# Patient Record
Sex: MALE | Race: WHITE | NOT HISPANIC OR LATINO | Employment: UNEMPLOYED | URBAN - NONMETROPOLITAN AREA
[De-identification: names, ages, dates, MRNs, and addresses within clinical notes are randomized per-mention and may not be internally consistent; named-entity substitution may affect disease eponyms.]

---

## 2017-11-27 ENCOUNTER — TRANSCRIBE ORDERS (OUTPATIENT)
Dept: CARDIOLOGY | Facility: CLINIC | Age: 47
End: 2017-11-27

## 2017-11-27 DIAGNOSIS — R07.9 CHEST PAIN, UNSPECIFIED TYPE: Primary | ICD-10-CM

## 2017-12-05 ENCOUNTER — CONSULT (OUTPATIENT)
Dept: CARDIOLOGY | Facility: CLINIC | Age: 47
End: 2017-12-05

## 2017-12-05 VITALS
DIASTOLIC BLOOD PRESSURE: 100 MMHG | HEART RATE: 72 BPM | SYSTOLIC BLOOD PRESSURE: 148 MMHG | HEIGHT: 66 IN | WEIGHT: 196 LBS | BODY MASS INDEX: 31.5 KG/M2

## 2017-12-05 DIAGNOSIS — R07.89 CHEST PRESSURE: ICD-10-CM

## 2017-12-05 DIAGNOSIS — R12 HEART BURN: ICD-10-CM

## 2017-12-05 DIAGNOSIS — R01.1 MURMUR, CARDIAC: ICD-10-CM

## 2017-12-05 DIAGNOSIS — I10 ESSENTIAL HYPERTENSION: ICD-10-CM

## 2017-12-05 DIAGNOSIS — E78.00 HYPERCHOLESTEREMIA: ICD-10-CM

## 2017-12-05 DIAGNOSIS — R06.02 SHORTNESS OF BREATH: ICD-10-CM

## 2017-12-05 DIAGNOSIS — I67.7 CEREBRAL VASCULITIS: ICD-10-CM

## 2017-12-05 PROCEDURE — 99204 OFFICE O/P NEW MOD 45 MIN: CPT | Performed by: INTERNAL MEDICINE

## 2017-12-05 PROCEDURE — 93000 ELECTROCARDIOGRAM COMPLETE: CPT | Performed by: INTERNAL MEDICINE

## 2017-12-05 RX ORDER — HYDROXYCHLOROQUINE SULFATE 200 MG/1
200 TABLET, FILM COATED ORAL 2 TIMES DAILY
COMMUNITY

## 2017-12-05 RX ORDER — AMLODIPINE BESYLATE 5 MG/1
5 TABLET ORAL DAILY
COMMUNITY
End: 2017-12-20 | Stop reason: DRUGHIGH

## 2017-12-05 RX ORDER — HYDROCHLOROTHIAZIDE 25 MG/1
25 TABLET ORAL DAILY
COMMUNITY
End: 2018-03-06 | Stop reason: HOSPADM

## 2017-12-05 RX ORDER — HYDRALAZINE HYDROCHLORIDE 25 MG/1
25 TABLET, FILM COATED ORAL 3 TIMES DAILY
COMMUNITY
End: 2018-03-06 | Stop reason: ALTCHOICE

## 2017-12-05 RX ORDER — ALPRAZOLAM 2 MG/1
2 TABLET ORAL 3 TIMES DAILY PRN
COMMUNITY

## 2017-12-05 RX ORDER — MYCOPHENOLATE MOFETIL 500 MG/1
TABLET ORAL
COMMUNITY
End: 2020-08-14 | Stop reason: ALTCHOICE

## 2017-12-05 RX ORDER — OXCARBAZEPINE 300 MG/1
900 TABLET, FILM COATED ORAL 2 TIMES DAILY
COMMUNITY

## 2017-12-05 RX ORDER — VALSARTAN 320 MG/1
320 TABLET ORAL DAILY
COMMUNITY
End: 2018-03-06 | Stop reason: HOSPADM

## 2017-12-05 RX ORDER — RANITIDINE 300 MG/1
300 TABLET ORAL NIGHTLY
Qty: 30 TABLET | Refills: 8 | Status: SHIPPED | OUTPATIENT
Start: 2017-12-05 | End: 2018-12-13 | Stop reason: ALTCHOICE

## 2017-12-05 NOTE — PROGRESS NOTES
CARDIAC COMPLAINTS  chest pressure/discomfort and dyspnea      Subjective   Myron Lackey is a 47 y.o. male came in today for his initial cardiac evaluation.  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  This was diagnosed in 2013 when he started having, altered mental status, confusion, wandering without knowing where he was.  He was sent to  where he was diagnosed with cerebritis, and was then sent to [facility] at [facility].  Then he was diagnosed with the above-mentioned diagnoses.  He was put on Cytoxan for one year and now takes CellCept.  He is now followed regularly at .  Recently his been having problem with his blood pressure going up and was started on hydralazine.  He also has been noticing constant chest tightness in the middle part of the chest which occurs more during the daytime.  He also has episode of sharp chest pain radiating to the right side.  He used to walk more in the past but now he is having problem walking secondary to shortness of breath.  He also has been complaining of having headache and body ache.  He is not sure when was the last time he had any labs done.  He denies having any nausea and vomiting.  He denies having any GI bleed.  He is not a smoker.  He has a strong family history of premature coronary artery disease.    No past surgical history on file.    Current Outpatient Prescriptions   Medication Sig Dispense Refill   • ALPRAZolam (XANAX) 1 MG tablet Take 1 mg by mouth 3 (Three) Times a Day As Needed for Anxiety     • amLODIPine (NORVASC) 5 MG tablet Take 5 mg by mouth Daily     • hydrALAZINE (APRESOLINE) 25 MG tablet Take 25 mg by mouth 3 (Three) Times a Day     • hydrochlorothiazide (HYDRODIURIL) 25 MG tablet Take 25 mg by mouth Daily     • hydroxychloroquine (PLAQUENIL) 200 MG tablet Take 200 mg by mouth 2 (Two) Times a Day     • mycophenolate (CELLCEPT) 500 MG tablet 3 tablets twice daily     • OXcarbazepine (TRILEPTAL) 300 MG  tablet Take 300 mg by mouth 2 (Two) Times a Day     • valsartan (DIOVAN) 320 MG tablet Take 320 mg by mouth Daily     • raNITIdine (ZANTAC) 300 MG tablet Take 1 tablet (300 mg total) by mouth Every Night 30 tablet 8     No current facility-administered medications for this visit.            ALLERGIES:  Lisinopril    Past Medical History:   Diagnosis Date   • Cerebral vasculitis    • History of surgery on arm     both arms   • Hyperlipidemia    • Hypertension    • Stroke 2014   • Systemic lupus        History   Smoking Status   • Never Smoker   Smokeless Tobacco   • Never Used          Family History   Problem Relation Age of Onset   • Hypertension Mother    • Hyperlipidemia Mother    • Lung disease Mother    • Heart attack Mother    • Heart disease Mother    • Peripheral vascular disease Mother    • Heart attack Father    • Heart disease Father    • Hypertension Father    • Hyperlipidemia Father    • Heart attack Maternal Uncle    • Heart disease Maternal Uncle    • Hypertension Maternal Uncle    • Hyperlipidemia Maternal Uncle    • Heart attack Paternal Uncle    • Heart disease Paternal Uncle    • Hypertension Paternal Uncle    • Hyperlipidemia Paternal Uncle    • Heart disease Maternal Grandmother    • Heart attack Maternal Grandmother    • Hypertension Maternal Grandmother    • Hyperlipidemia Maternal Grandmother    • Heart attack Maternal Grandfather    • Heart disease Maternal Grandfather    • Hypertension Maternal Grandfather    • Hyperlipidemia Maternal Grandfather    • Heart attack Paternal Grandmother    • Heart disease Paternal Grandmother    • Hypertension Paternal Grandmother    • Hyperlipidemia Paternal Grandmother    • Heart attack Paternal Grandfather    • Heart disease Paternal Grandfather    • Hypertension Paternal Grandfather    • Hyperlipidemia Paternal Grandfather        Review of Systems   Constitution: Positive for weakness and malaise/fatigue. Negative for decreased appetite.   HENT: Negative  "for congestion and sore throat.    Eyes: Negative for blurred vision.   Cardiovascular: Positive for chest pain, dyspnea on exertion and palpitations.   Respiratory: Positive for shortness of breath. Negative for snoring.    Endocrine: Negative for cold intolerance and heat intolerance.   Hematologic/Lymphatic: Negative for adenopathy. Does not bruise/bleed easily.   Skin: Negative for itching, nail changes and skin cancer.   Musculoskeletal: Positive for arthritis and myalgias.   Gastrointestinal: Positive for heartburn. Negative for abdominal pain and dysphagia.   Genitourinary: Negative for bladder incontinence and frequency.   Neurological: Positive for difficulty with concentration and dizziness. Negative for light-headedness, seizures and vertigo.   Psychiatric/Behavioral: Positive for memory loss. Negative for altered mental status.   Allergic/Immunologic: Negative for environmental allergies and hives.       Diabetes- No  Thyroid- normal    Objective     /100 (BP Location: Left arm)  Pulse 72  Ht 167.6 cm (65.98\")  Wt 88.9 kg (196 lb)  BMI 31.65 kg/m2    Physical Exam   Constitutional: He is oriented to person, place, and time. He appears well-developed and well-nourished.   HENT:   Head: Normocephalic.   Nose: Nose normal.   Eyes: Conjunctivae are normal. Pupils are equal, round, and reactive to light.   Neck: Normal range of motion. Neck supple.   Cardiovascular: Normal rate, regular rhythm, S1 normal and S2 normal.    Murmur heard.  Pulmonary/Chest: Effort normal and breath sounds normal.   Abdominal: Soft. Bowel sounds are normal.   Musculoskeletal: Normal range of motion. He exhibits no edema.   Neurological: He is alert and oriented to person, place, and time.   Skin: Skin is warm and dry.   Psychiatric: He has a normal mood and affect.         ECG 12 Lead  Date/Time: 12/5/2017 2:25 PM  Performed by: BYRON RHODES  Authorized by: BYRON RHODES   Previous ECG: no previous ECG " available  Rhythm: sinus rhythm  Rate: normal  QRS axis: normal  Clinical impression: non-specific ECG              Assessment/Plan     Myron was seen today for establish care, chest pain and shortness of breath.    Diagnoses and all orders for this visit:    Cerebral vasculitis  -     Sedimentation Rate; Future    Essential hypertension  -     Comprehensive Metabolic Panel; Future    Hypercholesteremia  -     Lipid Panel; Future  -     Stress Test With Myocardial Perfusion One Day; Future    Chest pressure  -     High Sensitivity CRP; Future  -     Stress Test With Myocardial Perfusion One Day; Future    Heart burn  -     raNITIdine (ZANTAC) 300 MG tablet; Take 1 tablet (300 mg total) by mouth Every Night    Shortness of breath  -     CBC & Differential; Future  -     Adult Transthoracic Echo Complete W/ Cont if Necessary Per Protocol; Future    Murmur, cardiac  -     Adult Transthoracic Echo Complete W/ Cont if Necessary Per Protocol; Future         At baseline his heart rate is stable.  Blood pressure is moderately elevated  His EKG showed sinus rhythm.  His clinical examination reveals short systolic murmur at the mitral area as well as in tricuspid area.  I advised him to undergo some lab work to recheck his renal function, lipids and blood count.  If his renal function is normal, I prefer him to be on an ACE inhibitor and stop Hydralazine.  I also scheduled him to undergo an echocardiogram to evaluate his LV function and the valvular structures.  He also need a stress test to rule out ischemia causing the chest tightness.  Since he is not able to walk much, it'll be done as Lexiscan Myoview.  Meanwhile some of his symptoms could be related to GI.  I started him on Zantac at 300 mg once a day till we get these tests done.  Based on the results of these tests, further recommendations will be made.           Electronically signed by Bethany Gallardo MD December 5, 2017 2:16 PM

## 2017-12-12 ENCOUNTER — HOSPITAL ENCOUNTER (OUTPATIENT)
Dept: CARDIOLOGY | Facility: HOSPITAL | Age: 47
Discharge: HOME | End: 2017-12-12
Attending: INTERNAL MEDICINE

## 2017-12-12 ENCOUNTER — OUTSIDE FACILITY SERVICE (OUTPATIENT)
Dept: CARDIOLOGY | Facility: CLINIC | Age: 47
End: 2017-12-12

## 2017-12-12 DIAGNOSIS — R06.02 SHORTNESS OF BREATH: ICD-10-CM

## 2017-12-12 DIAGNOSIS — R07.89 CHEST PRESSURE: ICD-10-CM

## 2017-12-12 DIAGNOSIS — E78.00 HYPERCHOLESTEREMIA: ICD-10-CM

## 2017-12-12 DIAGNOSIS — R01.1 MURMUR, CARDIAC: ICD-10-CM

## 2017-12-12 LAB
MAXIMAL PREDICTED HEART RATE: 173 BPM
MAXIMAL PREDICTED HEART RATE: 173 BPM
STRESS TARGET HR: 147 BPM
STRESS TARGET HR: 147 BPM

## 2017-12-12 PROCEDURE — 93306 TTE W/DOPPLER COMPLETE: CPT | Performed by: INTERNAL MEDICINE

## 2017-12-12 PROCEDURE — 78452 HT MUSCLE IMAGE SPECT MULT: CPT | Performed by: INTERNAL MEDICINE

## 2017-12-12 PROCEDURE — A9500 TC99M SESTAMIBI: HCPCS | Performed by: INTERNAL MEDICINE

## 2017-12-12 PROCEDURE — 93017 CV STRESS TEST TRACING ONLY: CPT

## 2017-12-12 PROCEDURE — 78452 HT MUSCLE IMAGE SPECT MULT: CPT

## 2017-12-12 PROCEDURE — 93306 TTE W/DOPPLER COMPLETE: CPT

## 2017-12-12 PROCEDURE — 93018 CV STRESS TEST I&R ONLY: CPT | Performed by: INTERNAL MEDICINE

## 2017-12-12 PROCEDURE — 25010000002 REGADENOSON 0.4 MG/5ML SOLUTION: Performed by: INTERNAL MEDICINE

## 2017-12-12 PROCEDURE — 0 TECHNETIUM SESTAMIBI: Performed by: INTERNAL MEDICINE

## 2017-12-12 RX ADMIN — TECHNETIUM TC-99M SESTAMIBI 1 DOSE: 1 INJECTION INTRAVENOUS at 16:15

## 2017-12-12 RX ADMIN — REGADENOSON 0.4 MG: 0.08 INJECTION, SOLUTION INTRAVENOUS at 16:15

## 2017-12-13 ENCOUNTER — TELEPHONE (OUTPATIENT)
Dept: CARDIOLOGY | Facility: CLINIC | Age: 47
End: 2017-12-13

## 2017-12-14 RX ORDER — ISOSORBIDE MONONITRATE 30 MG/1
30 TABLET, EXTENDED RELEASE ORAL DAILY
Qty: 30 TABLET | Refills: 1 | Status: SHIPPED | OUTPATIENT
Start: 2017-12-14 | End: 2018-03-06 | Stop reason: ALTCHOICE

## 2017-12-14 NOTE — TELEPHONE ENCOUNTER
Trying to reach Mrs. Lackey back.  She left a , patient does want to proceed with appointment next Wednesday with Patricia.

## 2017-12-14 NOTE — TELEPHONE ENCOUNTER
I spoke with patient's wife, Kimberly Lackey.  Aware of stress test and echo results and recommendations.    Cannot rule out small inferior wall ischemia.  Add Imdur and need elective cardiac catheterization.    Kimberly is going to discuss with patient.  She is going to see if he wants to come in to discuss or go forward with [facility].  Imdur sent to [facility] in [place].  They leave in the morning for [place] and will be back on Monday.

## 2017-12-20 ENCOUNTER — OFFICE VISIT (OUTPATIENT)
Dept: CARDIOLOGY | Facility: CLINIC | Age: 47
End: 2017-12-20

## 2017-12-20 VITALS
WEIGHT: 201 LBS | SYSTOLIC BLOOD PRESSURE: 140 MMHG | DIASTOLIC BLOOD PRESSURE: 110 MMHG | BODY MASS INDEX: 32.3 KG/M2 | HEART RATE: 76 BPM | HEIGHT: 66 IN

## 2017-12-20 DIAGNOSIS — R94.39 ABNORMAL STRESS TEST: Primary | ICD-10-CM

## 2017-12-20 DIAGNOSIS — R07.89 CHEST PRESSURE: ICD-10-CM

## 2017-12-20 DIAGNOSIS — E78.00 HYPERCHOLESTEREMIA: ICD-10-CM

## 2017-12-20 DIAGNOSIS — I67.7 CEREBRAL VASCULITIS: ICD-10-CM

## 2017-12-20 DIAGNOSIS — R06.02 SHORTNESS OF BREATH: ICD-10-CM

## 2017-12-20 DIAGNOSIS — I10 ESSENTIAL HYPERTENSION: ICD-10-CM

## 2017-12-20 PROCEDURE — 99213 OFFICE O/P EST LOW 20 MIN: CPT | Performed by: NURSE PRACTITIONER

## 2017-12-20 RX ORDER — AMLODIPINE BESYLATE 10 MG/1
10 TABLET ORAL DAILY
Qty: 30 TABLET | Refills: 11 | Status: SHIPPED | OUTPATIENT
Start: 2017-12-20 | End: 2018-03-06 | Stop reason: DRUGHIGH

## 2017-12-20 NOTE — PROGRESS NOTES
Chief Complaint   Patient presents with   • Follow-up     abnormal stress test. patient did not bring in medication list with visit. patient verbalizes medication's.    • Shortness of Breath     at times.   • Med Refill     refill's on cardiac medication's.       Subjective       Myron Lackey is a 47 y.o. male who was referred recently for his initial cardiac evaluation.  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  He was sent to  where he was diagnosed with cerebritis and was then sent to [facility] at [facility].  He was put on Cytoxan for one year and now takes CellCept.  He is followed regularly at .  He has had hypertension for a long while but recently noted to be exteremly high and was started on hydralazine. He was having chest tightness and sharp chest pain as well as shortness of breath and was advised to undergo cardiac work up.  His stress test showed possible inferior wall ischemia.  Echocardiogram showed good LV function only mild MR with RVSP at 22 mmHg.  Imdur was recommended with cardiac cath for a definitve diagnosis.  He has just returned from [place] and has not had lab work done yet.  He was afraid to start Imdur for fear of headache while being out of town.  He is here today to discuss the findings of his stress.     HPI         Cardiac History:    Past Surgical History:   Procedure Laterality Date   • CARDIOVASCULAR STRESS TEST  12/12/2017    L. Myoview- R/O Inferior Ischemia   • ECHO - CONVERTED  12/12/2017    EF 65%       Current Outpatient Prescriptions   Medication Sig Dispense Refill   • ALPRAZolam (XANAX) 1 MG tablet Take 1 mg by mouth 3 (Three) Times a Day As Needed for Anxiety     • hydrALAZINE (APRESOLINE) 25 MG tablet Take 25 mg by mouth 3 (Three) Times a Day     • hydrochlorothiazide  (HYDRODIURIL) 25 MG tablet Take 25 mg by mouth Daily     • hydroxychloroquine (PLAQUENIL) 200 MG tablet Take 200 mg by mouth 2 (Two) Times a Day     • mycophenolate (CELLCEPT) 500 MG tablet 3 tablets twice daily     • OXcarbazepine (TRILEPTAL) 300 MG tablet Take 300 mg by mouth 3 (Three) Times a Day.     • raNITIdine (ZANTAC) 300 MG tablet Take 1 tablet (300 mg total) by mouth Every Night 30 tablet 8   • valsartan (DIOVAN) 320 MG tablet Take 320 mg by mouth Daily     • amLODIPine (NORVASC) 10 MG tablet Take 1 tablet by mouth Daily. 30 tablet 11   • isosorbide mononitrate (IMDUR) 30 MG 24 hr tablet Take 1 tablet by mouth Daily. 30 tablet 1     No current facility-administered medications for this visit.        Lisinopril    Past Medical History:   Diagnosis Date   • Cerebral vasculitis    • History of surgery on arm     both arms   • Hyperlipidemia    • Hypertension    • Stroke 2014   • Systemic lupus        Social History     Social History   • Marital status:      Spouse name: N/A   • Number of children: N/A   • Years of education: N/A     Occupational History   • Not on file.     Social History Main Topics   • Smoking status: Never Smoker   • Smokeless tobacco: Never Used   • Alcohol use Yes      Comment: occasional   • Drug use: No   • Sexual activity: Not on file     Other Topics Concern   • Not on file     Social History Narrative       Family History   Problem Relation Age of Onset   • Hypertension Mother    • Hyperlipidemia Mother    • Lung disease Mother    • Heart attack Mother    • Heart disease Mother    • Peripheral vascular disease Mother    • Heart attack Father    • Heart disease Father    • Hypertension Father    • Hyperlipidemia Father    • Heart attack Maternal Uncle    • Heart disease Maternal Uncle    • Hypertension Maternal Uncle    • Hyperlipidemia Maternal Uncle    • Heart attack Paternal Uncle    • Heart disease Paternal Uncle    • Hypertension Paternal Uncle    • Hyperlipidemia  "Paternal Uncle    • Heart disease Maternal Grandmother    • Heart attack Maternal Grandmother    • Hypertension Maternal Grandmother    • Hyperlipidemia Maternal Grandmother    • Heart attack Maternal Grandfather    • Heart disease Maternal Grandfather    • Hypertension Maternal Grandfather    • Hyperlipidemia Maternal Grandfather    • Heart attack Paternal Grandmother    • Heart disease Paternal Grandmother    • Hypertension Paternal Grandmother    • Hyperlipidemia Paternal Grandmother    • Heart attack Paternal Grandfather    • Heart disease Paternal Grandfather    • Hypertension Paternal Grandfather    • Hyperlipidemia Paternal Grandfather        Review of Systems   Constitution: Positive for weakness and malaise/fatigue.   Eyes: Negative.    Cardiovascular: Positive for chest pain and dyspnea on exertion. Negative for leg swelling, palpitations and syncope.   Respiratory: Positive for shortness of breath. Negative for cough.    Endocrine: Negative.    Hematologic/Lymphatic: Negative.    Skin: Negative.    Musculoskeletal: Positive for arthritis, joint pain and muscle weakness.   Gastrointestinal: Positive for heartburn. Negative for abdominal pain, melena and nausea.   Genitourinary: Negative for dysuria and hematuria.   Neurological: Positive for dizziness and headaches.   Psychiatric/Behavioral: Negative for altered mental status and depression.   Allergic/Immunologic: Negative.         Diabetes- No  Thyroid-not available     Objective     BP (!) 140/110 (BP Location: Left arm)  Pulse 76  Ht 167.6 cm (66\")  Wt 91.2 kg (201 lb)  BMI 32.44 kg/m2    Physical Exam   Constitutional: He is oriented to person, place, and time. He appears well-developed and well-nourished.   HENT:   Head: Normocephalic.   Eyes: Pupils are equal, round, and reactive to light.   Neck: Normal range of motion.   Cardiovascular: Normal rate, regular rhythm and intact distal pulses.    Murmur heard.  Pulmonary/Chest: Effort normal and " breath sounds normal. No respiratory distress.   Abdominal: Soft. Bowel sounds are normal. He exhibits no distension.   Musculoskeletal: Normal range of motion. He exhibits no edema.   Neurological: He is alert and oriented to person, place, and time.   Skin: Skin is warm and dry. No erythema.   Psychiatric: He has a normal mood and affect.      Procedures          Assessment/Plan    Blood pressure elevated today.  Heart rate stable.  Discussed with Dr. Gallardo.  Advised to increase amlodipine to 10  Mg daily.  We discussed his stress and echo findings in detail and recommendation for cardiac cath.  This has been scheduled for January 2nd.  He will have labs done tomorrow.  He prefers to wait on the nitrates.  If his chest pain worsens prior to scheduled procedure, he is advised to go to the emergency room.  He is advised to monitor blood pressure to ensure increasing amlodipine is effective.  We will see him back after the procedure is complete.      Myron was seen today for follow-up, shortness of breath and med refill.    Diagnoses and all orders for this visit:    Abnormal stress test    Essential hypertension    Cerebral vasculitis    Hypercholesteremia    Shortness of breath    Chest pressure    Other orders  -     amLODIPine (NORVASC) 10 MG tablet; Take 1 tablet by mouth Daily.                  Electronically signed by CHON Ansari,  December 22, 2017 12:16 PM

## 2017-12-21 ENCOUNTER — LAB (OUTPATIENT)
Dept: LAB | Facility: HOSPITAL | Age: 47
End: 2017-12-21
Attending: INTERNAL MEDICINE

## 2017-12-21 DIAGNOSIS — R06.02 SHORTNESS OF BREATH: ICD-10-CM

## 2017-12-21 DIAGNOSIS — I67.7 CEREBRAL VASCULITIS: ICD-10-CM

## 2017-12-21 DIAGNOSIS — R07.89 CHEST PRESSURE: ICD-10-CM

## 2017-12-21 DIAGNOSIS — I10 ESSENTIAL HYPERTENSION: ICD-10-CM

## 2017-12-21 DIAGNOSIS — E78.00 HYPERCHOLESTEREMIA: ICD-10-CM

## 2017-12-21 LAB
ALBUMIN SERPL-MCNC: 4.9 G/DL (ref 3.5–5)
ALBUMIN/GLOB SERPL: 2.5 G/DL (ref 1.5–2.5)
ALP SERPL-CCNC: 87 U/L (ref 40–129)
ALT SERPL W P-5'-P-CCNC: 73 U/L (ref 10–44)
ANION GAP SERPL CALCULATED.3IONS-SCNC: 9.2 MMOL/L (ref 3.6–11.2)
AST SERPL-CCNC: 43 U/L (ref 10–34)
BASOPHILS # BLD AUTO: 0.02 10*3/MM3 (ref 0–0.3)
BASOPHILS NFR BLD AUTO: 0.4 % (ref 0–2)
BILIRUB SERPL-MCNC: 0.7 MG/DL (ref 0.2–1.8)
BUN BLD-MCNC: 18 MG/DL (ref 7–21)
BUN/CREAT SERPL: 19.6 (ref 7–25)
CALCIUM SPEC-SCNC: 10 MG/DL (ref 7.7–10)
CHLORIDE SERPL-SCNC: 103 MMOL/L (ref 99–112)
CHOLEST SERPL-MCNC: 195 MG/DL (ref 0–200)
CO2 SERPL-SCNC: 23.8 MMOL/L (ref 24.3–31.9)
CREAT BLD-MCNC: 0.92 MG/DL (ref 0.43–1.29)
DEPRECATED RDW RBC AUTO: 39.8 FL (ref 37–54)
EOSINOPHIL # BLD AUTO: 0.16 10*3/MM3 (ref 0–0.7)
EOSINOPHIL NFR BLD AUTO: 2.9 % (ref 0–5)
ERYTHROCYTE [DISTWIDTH] IN BLOOD BY AUTOMATED COUNT: 13 % (ref 11.5–14.5)
ERYTHROCYTE [SEDIMENTATION RATE] IN BLOOD: 6 MM/HR (ref 0–15)
GFR SERPL CREATININE-BSD FRML MDRD: 88 ML/MIN/1.73
GLOBULIN UR ELPH-MCNC: 2 GM/DL
GLUCOSE BLD-MCNC: 97 MG/DL (ref 70–110)
HCT VFR BLD AUTO: 44.5 % (ref 42–52)
HDLC SERPL-MCNC: 37 MG/DL (ref 60–100)
HGB BLD-MCNC: 15.8 G/DL (ref 14–18)
IMM GRANULOCYTES # BLD: 0.01 10*3/MM3 (ref 0–0.03)
IMM GRANULOCYTES NFR BLD: 0.2 % (ref 0–0.5)
LDLC SERPL CALC-MCNC: 82 MG/DL (ref 0–100)
LDLC/HDLC SERPL: 2.22 {RATIO}
LYMPHOCYTES # BLD AUTO: 1.03 10*3/MM3 (ref 1–3)
LYMPHOCYTES NFR BLD AUTO: 18.6 % (ref 21–51)
MCH RBC QN AUTO: 30.7 PG (ref 27–33)
MCHC RBC AUTO-ENTMCNC: 35.5 G/DL (ref 33–37)
MCV RBC AUTO: 86.6 FL (ref 80–94)
MONOCYTES # BLD AUTO: 0.72 10*3/MM3 (ref 0.1–0.9)
MONOCYTES NFR BLD AUTO: 13 % (ref 0–10)
NEUTROPHILS # BLD AUTO: 3.6 10*3/MM3 (ref 1.4–6.5)
NEUTROPHILS NFR BLD AUTO: 64.9 % (ref 30–70)
OSMOLALITY SERPL CALC.SUM OF ELEC: 273.8 MOSM/KG (ref 273–305)
PLATELET # BLD AUTO: 221 10*3/MM3 (ref 130–400)
PMV BLD AUTO: 9.9 FL (ref 6–10)
POTASSIUM BLD-SCNC: 3.4 MMOL/L (ref 3.5–5.3)
PROT SERPL-MCNC: 6.9 G/DL (ref 6–8)
RBC # BLD AUTO: 5.14 10*6/MM3 (ref 4.7–6.1)
SODIUM BLD-SCNC: 136 MMOL/L (ref 135–153)
TRIGL SERPL-MCNC: 380 MG/DL (ref 0–150)
VLDLC SERPL-MCNC: 76 MG/DL
WBC NRBC COR # BLD: 5.54 10*3/MM3 (ref 4.5–12.5)

## 2017-12-21 PROCEDURE — 85652 RBC SED RATE AUTOMATED: CPT | Performed by: INTERNAL MEDICINE

## 2017-12-21 PROCEDURE — 36415 COLL VENOUS BLD VENIPUNCTURE: CPT

## 2017-12-21 PROCEDURE — 80061 LIPID PANEL: CPT | Performed by: INTERNAL MEDICINE

## 2017-12-21 PROCEDURE — 80053 COMPREHEN METABOLIC PANEL: CPT | Performed by: INTERNAL MEDICINE

## 2017-12-21 PROCEDURE — 85025 COMPLETE CBC W/AUTO DIFF WBC: CPT | Performed by: INTERNAL MEDICINE

## 2017-12-21 PROCEDURE — 86141 C-REACTIVE PROTEIN HS: CPT | Performed by: INTERNAL MEDICINE

## 2017-12-22 LAB — CRP SERPL HS-MCNC: 4.95 MG/L (ref 0–3)

## 2018-01-03 ENCOUNTER — TELEPHONE (OUTPATIENT)
Dept: CARDIOLOGY | Facility: CLINIC | Age: 48
End: 2018-01-03

## 2018-01-03 NOTE — TELEPHONE ENCOUNTER
Patient called regarding his cardiac cath originally scheduled for 1/2/18.  He was unable to keep that appointment secondary to health problems with his parents.  He wanted to reschedule to 1/29/18.

## 2018-01-29 ENCOUNTER — OUTSIDE FACILITY SERVICE (OUTPATIENT)
Dept: CARDIOLOGY | Facility: CLINIC | Age: 48
End: 2018-01-29

## 2018-01-29 PROCEDURE — 93458 L HRT ARTERY/VENTRICLE ANGIO: CPT | Performed by: INTERNAL MEDICINE

## 2018-01-30 ENCOUNTER — OUTSIDE FACILITY SERVICE (OUTPATIENT)
Dept: CARDIOLOGY | Facility: CLINIC | Age: 48
End: 2018-01-30

## 2018-01-30 PROCEDURE — 99217 PR OBSERVATION CARE DISCHARGE MANAGEMENT: CPT | Performed by: INTERNAL MEDICINE

## 2018-03-06 ENCOUNTER — OFFICE VISIT (OUTPATIENT)
Dept: CARDIOLOGY | Facility: CLINIC | Age: 48
End: 2018-03-06

## 2018-03-06 VITALS
DIASTOLIC BLOOD PRESSURE: 110 MMHG | HEIGHT: 66 IN | WEIGHT: 191 LBS | BODY MASS INDEX: 30.7 KG/M2 | SYSTOLIC BLOOD PRESSURE: 140 MMHG | HEART RATE: 76 BPM

## 2018-03-06 DIAGNOSIS — E66.9 OBESITY (BMI 30.0-34.9): ICD-10-CM

## 2018-03-06 DIAGNOSIS — I10 ESSENTIAL HYPERTENSION: ICD-10-CM

## 2018-03-06 DIAGNOSIS — I25.10 CORONARY ARTERY DISEASE INVOLVING NATIVE CORONARY ARTERY OF NATIVE HEART WITHOUT ANGINA PECTORIS: ICD-10-CM

## 2018-03-06 DIAGNOSIS — E78.00 PURE HYPERCHOLESTEROLEMIA: Primary | ICD-10-CM

## 2018-03-06 DIAGNOSIS — I67.7 CEREBRAL VASCULITIS: ICD-10-CM

## 2018-03-06 DIAGNOSIS — F43.9 STRESS AT HOME: ICD-10-CM

## 2018-03-06 DIAGNOSIS — R07.89 OTHER CHEST PAIN: ICD-10-CM

## 2018-03-06 PROCEDURE — 99214 OFFICE O/P EST MOD 30 MIN: CPT | Performed by: NURSE PRACTITIONER

## 2018-03-06 RX ORDER — ATORVASTATIN CALCIUM 10 MG/1
10 TABLET, FILM COATED ORAL DAILY
Qty: 90 TABLET | Refills: 2 | Status: SHIPPED | OUTPATIENT
Start: 2018-03-06 | End: 2018-10-19 | Stop reason: SDUPTHER

## 2018-03-06 RX ORDER — AMLODIPINE BESYLATE 5 MG/1
5 TABLET ORAL DAILY
COMMUNITY
End: 2018-03-06 | Stop reason: DRUGHIGH

## 2018-03-06 RX ORDER — AMLODIPINE BESYLATE 10 MG/1
10 TABLET ORAL DAILY
Qty: 90 TABLET | Refills: 2 | Status: SHIPPED | OUTPATIENT
Start: 2018-03-06 | End: 2018-10-19 | Stop reason: SDUPTHER

## 2018-03-06 RX ORDER — VALSARTAN AND HYDROCHLOROTHIAZIDE 320; 25 MG/1; MG/1
1 TABLET, FILM COATED ORAL DAILY
Qty: 90 TABLET | Refills: 2 | Status: SHIPPED | OUTPATIENT
Start: 2018-03-06 | End: 2018-10-03 | Stop reason: ALTCHOICE

## 2018-03-06 RX ORDER — ATORVASTATIN CALCIUM 10 MG/1
10 TABLET, FILM COATED ORAL DAILY
COMMUNITY
End: 2018-03-06 | Stop reason: SDUPTHER

## 2018-03-06 RX ORDER — IBUPROFEN 800 MG/1
800 TABLET ORAL EVERY 6 HOURS PRN
COMMUNITY
End: 2020-08-14 | Stop reason: ALTCHOICE

## 2018-03-06 NOTE — PROGRESS NOTES
Chief Complaint   Patient presents with   • Hospital Follow-up     Had cardiac cath on 01/29/18 with stenting. Would like scripts for amlodipine, valsartan, and hctz to [facility]. Reports that he has had some chest pains that is like an ache that is not all the time. Reports he has been under some stress family.        Cardiac Complaints  chest pressure/discomfort      Subjective   Myron Lackey is a 47 y.o. male with  hypertension,hyperlipidemia, and IHD and cerebral vasculitis secondary to systemic lupus which was diagnosed in 2013 when he started having altered mental status, confusion, and wandering without knowing where he was.  He was sent to  where he was diagnosed with cerebritis and was then sent to [facility] in [facility].  He was put on Cytoxan for one year, but now takes CellCept.  He is followed regularly at .  He has had hypertension for a long while but was started on hydralazine as it remained elevated.  He was having chest tightness and sharp chest pain as well as shortness of breath and was advised to undergo cardiac work up.  His stress test showed possible inferior wall ischemia. Echocardiogram showed good LV function only mild MR with RVSP at 22 mmHg.  Imdur was recommended with cardiac cath for a definitve diagnosis.  At last visit, amlodipine was increased for better blood pressure control.  Cath done after the visit showed a 95% stenosis of left PDA which was stented along with PTCA of left circumflex.  He returns today for follow up and states he has done well and feels better since stenting.  He does continue to have an ache in his chest but states only on rare occasion.  He has had issues with his blood pressure being high but attributes to a great deal of stress in his family.  Labs from hospital showed a very elevated triglycerides and lipitor was added.       Cardiac History  Past Surgical History:   Procedure Laterality Date   • CARDIAC CATHETERIZATION   01/29/2018    95% (L) PDA- 2.5x22 Reolute. PTCA of LCX   • CARDIOVASCULAR STRESS TEST  12/12/2017    L. Myoview- R/O Inferior Ischemia   • ECHO - CONVERTED  12/12/2017    EF 65%       Current Outpatient Prescriptions   Medication Sig Dispense Refill   • ALPRAZolam (XANAX) 1 MG tablet Take 1 mg by mouth 3 (Three) Times a Day As Needed for Anxiety     • atorvastatin (LIPITOR) 10 MG tablet Take 1 tablet by mouth Daily. 90 tablet 2   • hydroxychloroquine (PLAQUENIL) 200 MG tablet Take 200 mg by mouth 2 (Two) Times a Day     • ibuprofen (ADVIL,MOTRIN) 800 MG tablet Take 800 mg by mouth Every 6 (Six) Hours As Needed for Mild Pain .     • mycophenolate (CELLCEPT) 500 MG tablet 3 tablets twice daily     • OXcarbazepine (TRILEPTAL) 300 MG tablet Take 300 mg by mouth 3 (Three) Times a Day.     • raNITIdine (ZANTAC) 300 MG tablet Take 1 tablet (300 mg total) by mouth Every Night 30 tablet 8   • ticagrelor (BRILINTA) 90 MG tablet tablet Take 1 tablet by mouth 2 (Two) Times a Day. 180 tablet 2   • amLODIPine (NORVASC) 10 MG tablet Take 1 tablet by mouth Daily. 90 tablet 2   • valsartan-hydrochlorothiazide (DIOVAN-HCT) 320-25 MG per tablet Take 1 tablet by mouth Daily. 90 tablet 2     No current facility-administered medications for this visit.        Lisinopril    Past Medical History:   Diagnosis Date   • Cerebral vasculitis    • History of surgery on arm     both arms   • Hyperlipidemia    • Hypertension    • Stroke 2014   • Systemic lupus        Social History     Social History   • Marital status:      Spouse name: N/A   • Number of children: N/A   • Years of education: N/A     Occupational History   • Not on file.     Social History Main Topics   • Smoking status: Never Smoker   • Smokeless tobacco: Never Used   • Alcohol use Yes      Comment: occasional   • Drug use: No   • Sexual activity: Not on file     Other Topics Concern   • Not on file     Social History Narrative       Family History   Problem Relation Age  of Onset   • Hypertension Mother    • Hyperlipidemia Mother    • Lung disease Mother    • Heart attack Mother    • Heart disease Mother    • Peripheral vascular disease Mother    • Heart attack Father    • Heart disease Father    • Hypertension Father    • Hyperlipidemia Father    • Heart attack Maternal Uncle    • Heart disease Maternal Uncle    • Hypertension Maternal Uncle    • Hyperlipidemia Maternal Uncle    • Heart attack Paternal Uncle    • Heart disease Paternal Uncle    • Hypertension Paternal Uncle    • Hyperlipidemia Paternal Uncle    • Heart disease Maternal Grandmother    • Heart attack Maternal Grandmother    • Hypertension Maternal Grandmother    • Hyperlipidemia Maternal Grandmother    • Heart attack Maternal Grandfather    • Heart disease Maternal Grandfather    • Hypertension Maternal Grandfather    • Hyperlipidemia Maternal Grandfather    • Heart attack Paternal Grandmother    • Heart disease Paternal Grandmother    • Hypertension Paternal Grandmother    • Hyperlipidemia Paternal Grandmother    • Heart attack Paternal Grandfather    • Heart disease Paternal Grandfather    • Hypertension Paternal Grandfather    • Hyperlipidemia Paternal Grandfather        Review of Systems   Constitution: Negative for weakness.   Cardiovascular: Positive for chest pain. Negative for claudication, dyspnea on exertion, leg swelling, near-syncope, orthopnea, palpitations and syncope.   Respiratory: Negative for cough, shortness of breath and wheezing.    Musculoskeletal: Negative for back pain, joint pain, joint swelling and muscle weakness.   Gastrointestinal: Negative for anorexia and nausea.   Genitourinary: Negative for dysuria, hesitancy and nocturia.   Neurological: Negative for dizziness, light-headedness and loss of balance.   Psychiatric/Behavioral: Negative for depression and memory loss. The patient is not nervous/anxious.        DiabetesNo  Thyroidnormal    Objective     BP (!) 140/110 (BP Location: Left  "arm)  Pulse 76  Ht 167.6 cm (65.98\")  Wt 86.6 kg (191 lb)  BMI 30.84 kg/m2    Physical Exam   Constitutional: He is oriented to person, place, and time. He appears well-developed and well-nourished.   HENT:   Head: Normocephalic and atraumatic.   Eyes: EOM are normal. Pupils are equal, round, and reactive to light.   Neck: Normal range of motion. Neck supple.   Cardiovascular: Normal rate and regular rhythm.    Pulmonary/Chest: Effort normal and breath sounds normal.   Abdominal: Soft.   Musculoskeletal: Normal range of motion.   Neurological: He is alert and oriented to person, place, and time.   Skin: Skin is warm and dry.   Psychiatric: He has a normal mood and affect. His behavior is normal.       Procedures    Assessment/Plan     HR is stable today.  BP is elevated at 140/110.  He reported only taking his norvasc at 5mg daily.  Patient encouraged to increase to 10mg.  We will continue his diovan and HCTZ at current dosing.  Patient to return in one week for blood pressure check.  Limited sodium intake advised.  Weight is down since last visit by 10 pounds.  He has limited his sugar and carbs as triglycerides in the hospital were very elevated.  He is now taking lipitor 10mg for mixed hyperlipidemia and is tolerating well.  Repeat order for CMP/FLP given, with more recommendations to follow.  He will continue on Brilinta therapy BID for recent stent to PDA, as no bleeding or increased bruising reported.  For lupus and cerebral vasculitis, he continues to follow with specialist in [place] and [facility].  Refills of cardiac meds sent per request.  Recent cath findings dicussed with patient.  Good cardiac diet with continued limitation of calories, starches, and sugars encouraged.  Patient also encouraged to be more active with walking.  6 month follow up advised or sooner if needed.        Problems Addressed this Visit        Cardiovascular and Mediastinum    Cerebral vasculitis    Essential hypertension "    Relevant Medications    amLODIPine (NORVASC) 10 MG tablet    valsartan-hydrochlorothiazide (DIOVAN-HCT) 320-25 MG per tablet    Other Relevant Orders    Comprehensive Metabolic Panel    Lipid Panel    TSH      Other Visit Diagnoses     Pure hypercholesterolemia    -  Primary    Relevant Medications    atorvastatin (LIPITOR) 10 MG tablet    Other Relevant Orders    Comprehensive Metabolic Panel    Lipid Panel    TSH    Coronary artery disease involving native coronary artery of native heart without angina pectoris        Relevant Medications    amLODIPine (NORVASC) 10 MG tablet    ticagrelor (BRILINTA) 90 MG tablet tablet    Other Relevant Orders    Comprehensive Metabolic Panel    Lipid Panel    TSH    Obesity (BMI 30.0-34.9)        Other chest pain        Stress at home            Patient's BMI is above normal parameters. Follow-up plan includes:  nutrition counseling.          Electronically signed by CHON Li March 6, 2018 4:42 PM

## 2018-03-07 ENCOUNTER — TELEPHONE (OUTPATIENT)
Dept: CARDIOLOGY | Facility: CLINIC | Age: 48
End: 2018-03-07

## 2018-03-13 ENCOUNTER — LAB (OUTPATIENT)
Dept: LAB | Facility: HOSPITAL | Age: 48
End: 2018-03-13

## 2018-03-13 DIAGNOSIS — E78.00 PURE HYPERCHOLESTEROLEMIA: ICD-10-CM

## 2018-03-13 DIAGNOSIS — I25.10 CORONARY ARTERY DISEASE INVOLVING NATIVE CORONARY ARTERY OF NATIVE HEART WITHOUT ANGINA PECTORIS: ICD-10-CM

## 2018-03-13 DIAGNOSIS — I10 ESSENTIAL HYPERTENSION: ICD-10-CM

## 2018-03-13 LAB
ALBUMIN SERPL-MCNC: 5.3 G/DL (ref 3.5–5)
ALBUMIN/GLOB SERPL: 2.4 G/DL (ref 1.5–2.5)
ALP SERPL-CCNC: 86 U/L (ref 40–129)
ALT SERPL W P-5'-P-CCNC: 67 U/L (ref 10–44)
ANION GAP SERPL CALCULATED.3IONS-SCNC: 11 MMOL/L (ref 3.6–11.2)
AST SERPL-CCNC: 37 U/L (ref 10–34)
BILIRUB SERPL-MCNC: 1 MG/DL (ref 0.2–1.8)
BUN BLD-MCNC: 16 MG/DL (ref 7–21)
BUN/CREAT SERPL: 16.8 (ref 7–25)
CALCIUM SPEC-SCNC: 10.3 MG/DL (ref 7.7–10)
CHLORIDE SERPL-SCNC: 105 MMOL/L (ref 99–112)
CHOLEST SERPL-MCNC: 161 MG/DL (ref 0–200)
CO2 SERPL-SCNC: 24 MMOL/L (ref 24.3–31.9)
CREAT BLD-MCNC: 0.95 MG/DL (ref 0.43–1.29)
GFR SERPL CREATININE-BSD FRML MDRD: 85 ML/MIN/1.73
GLOBULIN UR ELPH-MCNC: 2.2 GM/DL
GLUCOSE BLD-MCNC: 96 MG/DL (ref 70–110)
HDLC SERPL-MCNC: 35 MG/DL (ref 60–100)
LDLC SERPL CALC-MCNC: 78 MG/DL (ref 0–100)
LDLC/HDLC SERPL: 2.22 {RATIO}
OSMOLALITY SERPL CALC.SUM OF ELEC: 280.4 MOSM/KG (ref 273–305)
POTASSIUM BLD-SCNC: 4 MMOL/L (ref 3.5–5.3)
PROT SERPL-MCNC: 7.5 G/DL (ref 6–8)
SODIUM BLD-SCNC: 140 MMOL/L (ref 135–153)
TRIGL SERPL-MCNC: 242 MG/DL (ref 0–150)
TSH SERPL DL<=0.05 MIU/L-ACNC: 2.74 MIU/ML (ref 0.55–4.78)
VLDLC SERPL-MCNC: 48.4 MG/DL

## 2018-03-13 PROCEDURE — 84443 ASSAY THYROID STIM HORMONE: CPT | Performed by: NURSE PRACTITIONER

## 2018-03-13 PROCEDURE — 80061 LIPID PANEL: CPT | Performed by: NURSE PRACTITIONER

## 2018-03-13 PROCEDURE — 80053 COMPREHEN METABOLIC PANEL: CPT | Performed by: NURSE PRACTITIONER

## 2018-03-13 NOTE — PROGRESS NOTES
Labs look better, except for calcium and albumin.  If eating a lot of dairy try to limit.  I know he has been doing low carb.  Please let him know, trigs are still high, but better.

## 2018-03-14 ENCOUNTER — TELEPHONE (OUTPATIENT)
Dept: CARDIOLOGY | Facility: CLINIC | Age: 48
End: 2018-03-14

## 2018-03-14 ENCOUNTER — CLINICAL SUPPORT (OUTPATIENT)
Dept: CARDIOLOGY | Facility: CLINIC | Age: 48
End: 2018-03-14

## 2018-03-14 VITALS — DIASTOLIC BLOOD PRESSURE: 86 MMHG | SYSTOLIC BLOOD PRESSURE: 130 MMHG | HEART RATE: 84 BPM

## 2018-03-14 DIAGNOSIS — Z01.30 BLOOD PRESSURE CHECK: Primary | ICD-10-CM

## 2018-03-14 RX ORDER — FENOFIBRATE 120 MG/1
120 TABLET ORAL DAILY
Qty: 30 EACH | Refills: 11 | Status: SHIPPED | OUTPATIENT
Start: 2018-03-14 | End: 2018-03-19 | Stop reason: ALTCHOICE

## 2018-03-14 NOTE — TELEPHONE ENCOUNTER
Script for fenofibrate 120 mg QD sent to [facility]. 30 day supplys with 11 refills.

## 2018-03-14 NOTE — TELEPHONE ENCOUNTER
Pt here for BP check today.  130/86, pulse 84. Discussed diet at length. Pt states he drinks a lot of V8 juice. Advised against due to very high in sodium.  Pt is asking your opinion if you feel his cardiac problems are related to his Lupus.

## 2018-03-14 NOTE — TELEPHONE ENCOUNTER
----- Message from CHON Ovalle sent at 3/13/2018  3:22 PM EDT -----  Labs look better, except for calcium and albumin.  If eating a lot of dairy try to limit.  I know he has been doing low carb.  Please let him know, trigs are still high, but better.

## 2018-03-15 ENCOUNTER — PRIOR AUTHORIZATION (OUTPATIENT)
Dept: CARDIOLOGY | Facility: CLINIC | Age: 48
End: 2018-03-15

## 2018-03-15 NOTE — TELEPHONE ENCOUNTER
Yes it could be.  Do I think that is all of it, probably not, but it can cause issues with connective tissue.  I think it is a combination of multiple factors, but the lupus can attribute to cardiac problems.

## 2018-03-19 ENCOUNTER — TELEPHONE (OUTPATIENT)
Dept: CARDIOLOGY | Facility: CLINIC | Age: 48
End: 2018-03-19

## 2018-03-19 RX ORDER — FENOFIBRATE 67 MG/1
67 CAPSULE ORAL
Qty: 90 CAPSULE | Refills: 3 | Status: SHIPPED | OUTPATIENT
Start: 2018-03-19 | End: 2018-10-19 | Stop reason: SDUPTHER

## 2018-03-19 NOTE — TELEPHONE ENCOUNTER
PA for Fenofibrate denied by insurance. Must try one of the following:  Fenofibrate micronized capsule  Fenofibrate nanocrystallized tablet or  Gemfibrozil tablet    Which do you suggest? Dose/directions please

## 2018-04-24 ENCOUNTER — TELEPHONE (OUTPATIENT)
Dept: CARDIOLOGY | Facility: CLINIC | Age: 48
End: 2018-04-24

## 2018-09-05 ENCOUNTER — TELEPHONE (OUTPATIENT)
Dept: CARDIOLOGY | Facility: CLINIC | Age: 48
End: 2018-09-05

## 2018-09-05 NOTE — TELEPHONE ENCOUNTER
Pt called, has an injury to his left knee, per MRI  torn meniscus and problems with ACL.  Pt is asking if stopping Brilinta is an option for surgery. He is going to see specialist at [place] but wanting to know before he goes what his options are. Resolute stent in January 2018.        Past Surgical History:   Procedure Laterality Date   • CARDIAC CATHETERIZATION   01/29/2018     95% (L) PDA- 2.5x22 Reolute. PTCA of LCX   • CARDIOVASCULAR STRESS TEST   12/12/2017     L. Myoview- R/O Inferior Ischemia   • ECHO - CONVERTED   12/12/2017     EF 65%
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
## 2018-10-03 ENCOUNTER — OFFICE VISIT (OUTPATIENT)
Dept: CARDIOLOGY | Facility: CLINIC | Age: 48
End: 2018-10-03

## 2018-10-03 ENCOUNTER — TELEPHONE (OUTPATIENT)
Dept: CARDIOLOGY | Facility: CLINIC | Age: 48
End: 2018-10-03

## 2018-10-03 VITALS
DIASTOLIC BLOOD PRESSURE: 98 MMHG | HEART RATE: 76 BPM | WEIGHT: 183 LBS | SYSTOLIC BLOOD PRESSURE: 150 MMHG | BODY MASS INDEX: 29.41 KG/M2 | HEIGHT: 66 IN

## 2018-10-03 DIAGNOSIS — I25.9 IHD (ISCHEMIC HEART DISEASE): ICD-10-CM

## 2018-10-03 DIAGNOSIS — R53.83 OTHER FATIGUE: ICD-10-CM

## 2018-10-03 DIAGNOSIS — I10 ESSENTIAL HYPERTENSION: ICD-10-CM

## 2018-10-03 DIAGNOSIS — I25.110 CORONARY ARTERY DISEASE INVOLVING NATIVE CORONARY ARTERY OF NATIVE HEART WITH UNSTABLE ANGINA PECTORIS (HCC): Primary | ICD-10-CM

## 2018-10-03 DIAGNOSIS — E78.00 HYPERCHOLESTEREMIA: ICD-10-CM

## 2018-10-03 PROCEDURE — 99214 OFFICE O/P EST MOD 30 MIN: CPT | Performed by: NURSE PRACTITIONER

## 2018-10-03 RX ORDER — LOSARTAN POTASSIUM AND HYDROCHLOROTHIAZIDE 12.5; 5 MG/1; MG/1
1 TABLET ORAL DAILY
Qty: 30 TABLET | Refills: 1 | Status: SHIPPED | OUTPATIENT
Start: 2018-10-03 | End: 2018-10-19 | Stop reason: SDUPTHER

## 2018-10-03 RX ORDER — ASPIRIN 81 MG/1
81 TABLET ORAL DAILY
COMMUNITY
End: 2020-06-22 | Stop reason: SDUPTHER

## 2018-10-03 NOTE — TELEPHONE ENCOUNTER
I sent prescription for Hyzaar 50/12.5 mg daily. Please advise him to monitor BP and if tolerates well, then call the office in week or two and we will send in mail order prescription. Thanks.

## 2018-10-03 NOTE — TELEPHONE ENCOUNTER
Left message with instructions about Hyzaar 50 mg once a day, to monitor BP and call in a week or two to let us know how you are doing on the medication.

## 2018-10-03 NOTE — TELEPHONE ENCOUNTER
This patient called stating he needed refills on Losartan 50 mg. In your note you mentioned that he was to call with Losartan dose and you would do an equivalent with Hyzaar.    I just wanted to be sure of the dose of Hyzaar before I sent it in. Is it Hyzaar 50 mg once a day?

## 2018-10-10 ENCOUNTER — HOSPITAL ENCOUNTER (OUTPATIENT)
Dept: CARDIOLOGY | Facility: HOSPITAL | Age: 48
Discharge: HOME | End: 2018-10-10

## 2018-10-10 ENCOUNTER — LAB (OUTPATIENT)
Dept: LAB | Facility: HOSPITAL | Age: 48
End: 2018-10-10

## 2018-10-10 DIAGNOSIS — E78.00 HYPERCHOLESTEREMIA: ICD-10-CM

## 2018-10-10 DIAGNOSIS — I10 ESSENTIAL HYPERTENSION: ICD-10-CM

## 2018-10-10 DIAGNOSIS — I25.9 IHD (ISCHEMIC HEART DISEASE): ICD-10-CM

## 2018-10-10 DIAGNOSIS — I25.110 CORONARY ARTERY DISEASE INVOLVING NATIVE CORONARY ARTERY OF NATIVE HEART WITH UNSTABLE ANGINA PECTORIS (HCC): ICD-10-CM

## 2018-10-10 LAB
BH CV STRESS COMMENTS STAGE 1: NORMAL
BH CV STRESS DOSE REGADENOSON STAGE 1: 0.4
BH CV STRESS DURATION MIN STAGE 1: 0
BH CV STRESS DURATION SEC STAGE 1: 10
BH CV STRESS PROTOCOL 1: NORMAL
BH CV STRESS RECOVERY BP: NORMAL MMHG
BH CV STRESS RECOVERY HR: 88 BPM
BH CV STRESS STAGE 1: 1
LV EF NUC BP: 52 %
MAXIMAL PREDICTED HEART RATE: 173 BPM
PERCENT MAX PREDICTED HR: 55.49 %
STRESS BASELINE BP: NORMAL MMHG
STRESS BASELINE HR: 68 BPM
STRESS PERCENT HR: 65 %
STRESS POST PEAK BP: NORMAL MMHG
STRESS POST PEAK HR: 96 BPM
STRESS TARGET HR: 147 BPM

## 2018-10-10 PROCEDURE — 78452 HT MUSCLE IMAGE SPECT MULT: CPT | Performed by: INTERNAL MEDICINE

## 2018-10-10 PROCEDURE — 80053 COMPREHEN METABOLIC PANEL: CPT | Performed by: NURSE PRACTITIONER

## 2018-10-10 PROCEDURE — 0 TECHNETIUM SESTAMIBI: Performed by: INTERNAL MEDICINE

## 2018-10-10 PROCEDURE — 36415 COLL VENOUS BLD VENIPUNCTURE: CPT

## 2018-10-10 PROCEDURE — 80061 LIPID PANEL: CPT | Performed by: NURSE PRACTITIONER

## 2018-10-10 PROCEDURE — 93017 CV STRESS TEST TRACING ONLY: CPT

## 2018-10-10 PROCEDURE — A9500 TC99M SESTAMIBI: HCPCS | Performed by: INTERNAL MEDICINE

## 2018-10-10 PROCEDURE — 25010000002 REGADENOSON 0.4 MG/5ML SOLUTION: Performed by: INTERNAL MEDICINE

## 2018-10-10 PROCEDURE — 78452 HT MUSCLE IMAGE SPECT MULT: CPT

## 2018-10-10 PROCEDURE — 93018 CV STRESS TEST I&R ONLY: CPT | Performed by: INTERNAL MEDICINE

## 2018-10-10 RX ADMIN — TECHNETIUM TC 99M SESTAMIBI 1 DOSE: 1 INJECTION INTRAVENOUS at 10:48

## 2018-10-10 RX ADMIN — REGADENOSON 0.4 MG: 0.08 INJECTION, SOLUTION INTRAVENOUS at 10:48

## 2018-10-11 LAB
ALBUMIN SERPL-MCNC: 4.9 G/DL (ref 3.5–5)
ALBUMIN/GLOB SERPL: 2.9 G/DL (ref 1.5–2.5)
ALP SERPL-CCNC: 94 U/L (ref 40–129)
ALT SERPL W P-5'-P-CCNC: 37 U/L (ref 10–44)
ANION GAP SERPL CALCULATED.3IONS-SCNC: 9.2 MMOL/L (ref 3.6–11.2)
AST SERPL-CCNC: 30 U/L (ref 10–34)
BILIRUB SERPL-MCNC: 0.7 MG/DL (ref 0.2–1.8)
BUN BLD-MCNC: 13 MG/DL (ref 7–21)
BUN/CREAT SERPL: 15.5 (ref 7–25)
CALCIUM SPEC-SCNC: 9.1 MG/DL (ref 7.7–10)
CHLORIDE SERPL-SCNC: 109 MMOL/L (ref 99–112)
CHOLEST SERPL-MCNC: 153 MG/DL (ref 0–200)
CO2 SERPL-SCNC: 20.8 MMOL/L (ref 24.3–31.9)
CREAT BLD-MCNC: 0.84 MG/DL (ref 0.43–1.29)
GFR SERPL CREATININE-BSD FRML MDRD: 98 ML/MIN/1.73
GLOBULIN UR ELPH-MCNC: 1.7 GM/DL
GLUCOSE BLD-MCNC: 89 MG/DL (ref 70–110)
HDLC SERPL-MCNC: 33 MG/DL (ref 60–100)
LDLC SERPL CALC-MCNC: 77 MG/DL (ref 0–100)
LDLC/HDLC SERPL: 2.35 {RATIO}
OSMOLALITY SERPL CALC.SUM OF ELEC: 277.1 MOSM/KG (ref 273–305)
POTASSIUM BLD-SCNC: 4.1 MMOL/L (ref 3.5–5.3)
PROT SERPL-MCNC: 6.6 G/DL (ref 6–8)
SODIUM BLD-SCNC: 139 MMOL/L (ref 135–153)
TRIGL SERPL-MCNC: 213 MG/DL (ref 0–150)
VLDLC SERPL-MCNC: 42.6 MG/DL

## 2018-10-12 RX ORDER — ISOSORBIDE MONONITRATE 30 MG/1
30 TABLET, EXTENDED RELEASE ORAL DAILY
Qty: 30 TABLET | Refills: 2 | Status: SHIPPED | OUTPATIENT
Start: 2018-10-12 | End: 2018-10-15 | Stop reason: SINTOL

## 2018-10-15 ENCOUNTER — TELEPHONE (OUTPATIENT)
Dept: CARDIOLOGY | Facility: CLINIC | Age: 48
End: 2018-10-15

## 2018-10-15 RX ORDER — RANOLAZINE 500 MG/1
500 TABLET, EXTENDED RELEASE ORAL EVERY 12 HOURS SCHEDULED
Qty: 60 TABLET | Refills: 2 | Status: SHIPPED | OUTPATIENT
Start: 2018-10-15 | End: 2018-10-19 | Stop reason: SDUPTHER

## 2018-10-15 NOTE — TELEPHONE ENCOUNTER
"Patient started Imdur 30 mg daily on Friday.  C/o HA since Friday.  Taking Tylenol, not helping.    Chest pain radiates to left side of neck, started yesterday and still having same symptoms today along with nausea.  He feels like his legs \"weigh a ton.\"      Advised patient to go to ER.      His cardiac cath is scheduled on Monday 10/22/18.    "

## 2018-10-16 ENCOUNTER — OUTSIDE FACILITY SERVICE (OUTPATIENT)
Dept: CARDIOLOGY | Facility: CLINIC | Age: 48
End: 2018-10-16

## 2018-10-16 PROCEDURE — 99214 OFFICE O/P EST MOD 30 MIN: CPT | Performed by: INTERNAL MEDICINE

## 2018-10-17 ENCOUNTER — OUTSIDE FACILITY SERVICE (OUTPATIENT)
Dept: CARDIOLOGY | Facility: CLINIC | Age: 48
End: 2018-10-17

## 2018-10-17 PROCEDURE — 93458 L HRT ARTERY/VENTRICLE ANGIO: CPT | Performed by: INTERNAL MEDICINE

## 2018-10-17 PROCEDURE — 93306 TTE W/DOPPLER COMPLETE: CPT | Performed by: INTERNAL MEDICINE

## 2018-10-19 ENCOUNTER — TELEPHONE (OUTPATIENT)
Dept: CARDIOLOGY | Facility: CLINIC | Age: 48
End: 2018-10-19

## 2018-10-19 RX ORDER — LOSARTAN POTASSIUM AND HYDROCHLOROTHIAZIDE 12.5; 5 MG/1; MG/1
1 TABLET ORAL DAILY
Qty: 90 TABLET | Refills: 3 | Status: SHIPPED | OUTPATIENT
Start: 2018-10-19 | End: 2019-06-13 | Stop reason: SDUPTHER

## 2018-10-19 RX ORDER — AMLODIPINE BESYLATE 10 MG/1
10 TABLET ORAL DAILY
Qty: 90 TABLET | Refills: 3 | Status: SHIPPED | OUTPATIENT
Start: 2018-10-19 | End: 2019-06-13 | Stop reason: SDUPTHER

## 2018-10-19 RX ORDER — ATORVASTATIN CALCIUM 10 MG/1
10 TABLET, FILM COATED ORAL DAILY
Qty: 90 TABLET | Refills: 3 | Status: SHIPPED | OUTPATIENT
Start: 2018-10-19 | End: 2019-06-13 | Stop reason: SDUPTHER

## 2018-10-19 RX ORDER — RANOLAZINE 500 MG/1
500 TABLET, EXTENDED RELEASE ORAL EVERY 12 HOURS SCHEDULED
Qty: 180 TABLET | Refills: 3 | Status: SHIPPED | OUTPATIENT
Start: 2018-10-19 | End: 2019-06-13 | Stop reason: SDUPTHER

## 2018-10-19 RX ORDER — FENOFIBRATE 67 MG/1
67 CAPSULE ORAL
Qty: 90 CAPSULE | Refills: 3 | Status: SHIPPED | OUTPATIENT
Start: 2018-10-19 | End: 2019-06-13 | Stop reason: SDUPTHER

## 2018-10-25 ENCOUNTER — TELEPHONE (OUTPATIENT)
Dept: CARDIOLOGY | Facility: CLINIC | Age: 48
End: 2018-10-25

## 2018-10-25 NOTE — TELEPHONE ENCOUNTER
[facility] pharmacy faxed request for clarification on Losartan/HCTZ secondary to possible cross sensitivity with Lisinopril since patient is allergic to Lisinopril.    I called [facility] [employer] [facility] and advised them to fill the Losartan/HCTZ and that we are aware of patient's allergy to Lisinopril.

## 2018-12-13 ENCOUNTER — OFFICE VISIT (OUTPATIENT)
Dept: CARDIOLOGY | Facility: CLINIC | Age: 48
End: 2018-12-13

## 2018-12-13 VITALS
WEIGHT: 188 LBS | HEIGHT: 66 IN | SYSTOLIC BLOOD PRESSURE: 102 MMHG | HEART RATE: 80 BPM | DIASTOLIC BLOOD PRESSURE: 76 MMHG | BODY MASS INDEX: 30.22 KG/M2

## 2018-12-13 DIAGNOSIS — R06.02 SHORTNESS OF BREATH: ICD-10-CM

## 2018-12-13 DIAGNOSIS — R01.1 MURMUR, CARDIAC: ICD-10-CM

## 2018-12-13 DIAGNOSIS — E78.00 HYPERCHOLESTEREMIA: ICD-10-CM

## 2018-12-13 DIAGNOSIS — I10 ESSENTIAL HYPERTENSION: ICD-10-CM

## 2018-12-13 DIAGNOSIS — I25.118 CORONARY ARTERY DISEASE OF NATIVE ARTERY OF NATIVE HEART WITH STABLE ANGINA PECTORIS (HCC): Primary | ICD-10-CM

## 2018-12-13 PROCEDURE — 99214 OFFICE O/P EST MOD 30 MIN: CPT | Performed by: NURSE PRACTITIONER

## 2018-12-13 NOTE — PATIENT INSTRUCTIONS
"Food Choices to Lower Your Triglycerides  Triglycerides are a type of fat in your blood. High levels of triglycerides can increase the risk of heart disease and stroke. If your triglyceride levels are high, the foods you eat and your eating habits are very important. Choosing the right foods can help lower your triglycerides.  What general guidelines do I need to follow?  · Lose weight if you are overweight.  · Limit or avoid alcohol.  · Fill one half of your plate with vegetables and green salads.  · Limit fruit to two servings a day. Choose fruit instead of juice.  · Make one fourth of your plate whole grains. Look for the word \"whole\" as the first word in the ingredient list.  · Fill one fourth of your plate with lean protein foods.  · Enjoy fatty fish (such as salmon, mackerel, sardines, and tuna) three times a week.  · Choose healthy fats.  · Limit foods high in starch and sugar.  · Eat more home-cooked food and less restaurant, buffet, and fast food.  · Limit fried foods.  · Cook foods using methods other than frying.  · Limit saturated fats.  · Check ingredient lists to avoid foods with partially hydrogenated oils (trans fats) in them.  What foods can I eat?  Grains  Whole grains, such as whole wheat or whole grain breads, crackers, cereals, and pasta. Unsweetened oatmeal, bulgur, barley, quinoa, or brown rice. Corn or whole wheat flour tortillas.  Vegetables  Fresh or frozen vegetables (raw, steamed, roasted, or grilled). Green salads.  Fruits  All fresh, canned (in natural juice), or frozen fruits.  Meat and Other Protein Products  Ground beef (85% or leaner), grass-fed beef, or beef trimmed of fat. Skinless chicken or turkey. Ground chicken or turkey. Pork trimmed of fat. All fish and seafood. Eggs. Dried beans, peas, or lentils. Unsalted nuts or seeds. Unsalted canned or dry beans.  Dairy  Low-fat dairy products, such as skim or 1% milk, 2% or reduced-fat cheeses, low-fat ricotta or cottage cheese, or " plain low-fat yogurt.  Fats and Oils  Tub margarines without trans fats. Light or reduced-fat mayonnaise and salad dressings. Avocado. Safflower, olive, or canola oils. Natural peanut or almond butter.  The items listed above may not be a complete list of recommended foods or beverages. Contact your dietitian for more options.  What foods are not recommended?  Grains  White bread. White pasta. White rice. Cornbread. Bagels, pastries, and croissants. Crackers that contain trans fat.  Vegetables  White potatoes. Corn. Creamed or fried vegetables. Vegetables in a cheese sauce.  Fruits  Dried fruits. Canned fruit in light or heavy syrup. Fruit juice.  Meat and Other Protein Products  Fatty cuts of meat. Ribs, chicken wings, banks, sausage, bologna, salami, chitterlings, fatback, hot dogs, bratwurst, and packaged luncheon meats.  Dairy  Whole or 2% milk, cream, half-and-half, and cream cheese. Whole-fat or sweetened yogurt. Full-fat cheeses. Nondairy creamers and whipped toppings. Processed cheese, cheese spreads, or cheese curds.  Sweets and Desserts  Corn syrup, sugars, honey, and molasses. Candy. Jam and jelly. Syrup. Sweetened cereals. Cookies, pies, cakes, donuts, muffins, and ice cream.  Fats and Oils  Butter, stick margarine, lard, shortening, ghee, or banks fat. Coconut, palm kernel, or palm oils.  Beverages  Alcohol. Sweetened drinks (such as sodas, lemonade, and fruit drinks or punches).  The items listed above may not be a complete list of foods and beverages to avoid. Contact your dietitian for more information.  This information is not intended to replace advice given to you by your health care provider. Make sure you discuss any questions you have with your health care provider.  Document Released: 10/05/2005 Document Revised: 05/25/2017 Document Reviewed: 10/22/2014  [employer] Patient Education © 2017 [employer]

## 2018-12-13 NOTE — PROGRESS NOTES
Chief Complaint   Patient presents with   • Hospital follow up     Patient was in hospital 10/16/18 with chest pain, had cardiac cath 10/17/18. He reports for the last couple days has been tired. He is questioning if could stop any of the medications he is takinig?   • Chest Pain     Has has some random mid chest pain. He did couple episode of chest pain with shortness of breath while he was deer hunting.        Subjective       Myron Lackey is a 48 y.o. male seen in December 2017 for initial cardiac evaluation. 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 He was diagnosed with cerebritis at  and was then sent to [facility] in [facility].  He was put on Cytoxan for one year, but now takes CellCept.  He is followed regularly at . He was referred here for CP. Stress test showed inferior wall changes. Cath on 1/29/18 showed 95% stenosis of left PDA which was stented along with PTCA of left circumflex. March 2018 lab report showed , triglycerides 242, HDL 35 and LDL 78. TSH 2.74, improved LFT.   He had an episode of severe, crushing chest pain during strenuous activity and was evaluated on 10/3/18 with cardiac work up recommended. Lexiscan was done secondary to knee injury which showed inferior infarct with ranjeet-infarct ischemia. Cath was scheduled but he developed more symptoms prior to cath date and presented to the ER, was admitted, and underwent cath showing patent stent and 65% isolated RCA lesion with acceptable FFR of .85. He was managed medically. He came today for hospital follow up. He continues to have occasional midsternal chest pain mostly when outdoors in the cold air. Lipids on 10/10/18 were well controlled with LDL 77 but triglycerides 213. CMP normal.   HPI         Cardiac History:    Past Surgical History:   Procedure Laterality Date   • CARDIAC  CATHETERIZATION  01/29/2018    95% (L) PDA- 2.5x22 Reolute. PTCA of LCX   • CARDIAC CATHETERIZATION      Patent stent, 65% RCA with FFR .85. [facility] mgmt   • CARDIOVASCULAR STRESS TEST  12/12/2017    L. Myoview- R/O Inferior Ischemia   • CARDIOVASCULAR STRESS TEST  10/10/2018    L. Cardiolite- EF 52%. Inferior Infarct with Periinfarct Ischemia.   • ECHO - CONVERTED  12/12/2017    EF 65%       Current Outpatient Medications   Medication Sig Dispense Refill   • ALPRAZolam (XANAX) 1 MG tablet Take 1 mg by mouth 3 (Three) Times a Day As Needed for Anxiety     • amLODIPine (NORVASC) 10 MG tablet Take 1 tablet by mouth Daily. 90 tablet 3   • aspirin 81 MG EC tablet Take 81 mg by mouth Daily.     • atorvastatin (LIPITOR) 10 MG tablet Take 1 tablet by mouth Daily. 90 tablet 3   • fenofibrate micronized (LOFIBRA) 67 MG capsule Take 1 capsule by mouth Every Morning Before Breakfast. 90 capsule 3   • hydroxychloroquine (PLAQUENIL) 200 MG tablet Take 200 mg by mouth 2 (Two) Times a Day     • ibuprofen (ADVIL,MOTRIN) 800 MG tablet Take 800 mg by mouth Every 6 (Six) Hours As Needed for Mild Pain .     • losartan-hydrochlorothiazide (HYZAAR) 50-12.5 MG per tablet Take 1 tablet by mouth Daily. 90 tablet 3   • mycophenolate (CELLCEPT) 500 MG tablet 6 tablets twice daily     • OXcarbazepine (TRILEPTAL) 300 MG tablet Take 300 mg by mouth 3 (Three) Times a Day.     • ranolazine (RANEXA) 500 MG 12 hr tablet Take 1 tablet by mouth Every 12 (Twelve) Hours. 180 tablet 3   • ticagrelor (BRILINTA) 90 MG tablet tablet Take 1 tablet by mouth 2 (Two) Times a Day. 180 tablet 3     No current facility-administered medications for this visit.        Imdur [isosorbide nitrate] and Lisinopril    Past Medical History:   Diagnosis Date   • Cerebral vasculitis    • History of surgery on arm     both arms   • Hyperlipidemia    • Hypertension    • Stroke (CMS/HCC) 2014   • Systemic lupus (CMS/HCC)        Social History     Socioeconomic History   •  Marital status:      Spouse name: Not on file   • Number of children: Not on file   • Years of education: Not on file   • Highest education level: Not on file   Social Needs   • Financial resource strain: Not on file   • Food insecurity - worry: Not on file   • Food insecurity - inability: Not on file   • Transportation needs - medical: Not on file   • Transportation needs - non-medical: Not on file   Occupational History   • Not on file   Tobacco Use   • Smoking status: Never Smoker   • Smokeless tobacco: Never Used   Substance and Sexual Activity   • Alcohol use: Yes     Comment: occasional   • Drug use: No   • Sexual activity: Not on file   Other Topics Concern   • Not on file   Social History Narrative   • Not on file       Family History   Problem Relation Age of Onset   • Hypertension Mother    • Hyperlipidemia Mother    • Lung disease Mother    • Heart attack Mother    • Heart disease Mother    • Peripheral vascular disease Mother    • Heart attack Father    • Heart disease Father    • Hypertension Father    • Hyperlipidemia Father    • Heart attack Maternal Uncle    • Heart disease Maternal Uncle    • Hypertension Maternal Uncle    • Hyperlipidemia Maternal Uncle    • Heart attack Paternal Uncle    • Heart disease Paternal Uncle    • Hypertension Paternal Uncle    • Hyperlipidemia Paternal Uncle    • Heart disease Maternal Grandmother    • Heart attack Maternal Grandmother    • Hypertension Maternal Grandmother    • Hyperlipidemia Maternal Grandmother    • Heart attack Maternal Grandfather    • Heart disease Maternal Grandfather    • Hypertension Maternal Grandfather    • Hyperlipidemia Maternal Grandfather    • Heart attack Paternal Grandmother    • Heart disease Paternal Grandmother    • Hypertension Paternal Grandmother    • Hyperlipidemia Paternal Grandmother    • Heart attack Paternal Grandfather    • Heart disease Paternal Grandfather    • Hypertension Paternal Grandfather    • Hyperlipidemia  "Paternal Grandfather        Review of Systems   Constitution: Positive for weakness, malaise/fatigue and weight gain (up 5 lb ). Negative for decreased appetite.   HENT: Positive for congestion (mild URI symptoms ).    Eyes: Negative.    Cardiovascular: Positive for chest pain and dyspnea on exertion. Negative for leg swelling, orthopnea, palpitations and syncope.   Respiratory: Positive for shortness of breath and wheezing (occasionally ). Negative for cough.    Endocrine: Positive for cold intolerance.   Hematologic/Lymphatic: Negative.    Skin: Negative.    Musculoskeletal: Positive for arthritis and joint pain. Negative for myalgias.   Gastrointestinal: Negative for abdominal pain, melena and nausea.   Genitourinary: Negative for dysuria and hematuria.   Neurological: Negative for dizziness.   Psychiatric/Behavioral: Negative for altered mental status and depression.   Allergic/Immunologic: Negative.       Diabetes- No  Thyroid-normal    Objective     /76 (BP Location: Left arm)   Pulse 80   Ht 167.6 cm (65.98\")   Wt 85.3 kg (188 lb)   BMI 30.36 kg/m²     Physical Exam   Constitutional: He is oriented to person, place, and time. He appears well-developed and well-nourished. No distress.   HENT:   Head: Normocephalic and atraumatic.   Eyes: Pupils are equal, round, and reactive to light.   Neck: Normal range of motion.   Cardiovascular: Normal rate, regular rhythm and intact distal pulses.   Pulmonary/Chest: Effort normal and breath sounds normal. No respiratory distress.   Mild rhonchi, clears with cough    Abdominal: Soft. Bowel sounds are normal. He exhibits no distension. There is no tenderness.   Musculoskeletal: Normal range of motion.   Neurological: He is alert and oriented to person, place, and time.   Skin: Skin is warm and dry. He is not diaphoretic. No erythema. No pallor.   Psychiatric: He has a normal mood and affect. His behavior is normal.   Nursing note and vitals " reviewed.    Procedures          Assessment/Plan    Heart rate and blood pressure are stable. We reviewed the hospital records including the cardiac cath report. I explained to him about the patent stent and moderate disease of the RCA with acceptable FFR. We will manage him medically with aggressive control of risk factors. His blood pressure is well controlled presently with current regimen. Lipids are also controlled with LDL 77. Tri mildly elevated and we discussed dietary changes to lower. Continue fenofibrate. Continue statin. He is advised to continue Brilinta at least until January which will be one year post stent. If he needs to undergo left knee surgery, he will be cleared to hold antiplatelet for surgery. He is going to contact our office if this is planned. He is advised to cover face when outdoors in the cold air. I explained to him about vasoconstriction and vasospasm in the cold temperatures. Regular exercise is encouraged but he is limited with his knee problem presently. Can try recumbent bike. He appears stable from a cardiac standpoint. We will see him back in six months or sooner if needed.   Myron was seen today for hospital follow up and chest pain.    Diagnoses and all orders for this visit:    Coronary artery disease of native artery of native heart with stable angina pectoris (CMS/[facility])    Essential hypertension    Hypercholesteremia    Murmur, cardiac    Shortness of breath        Patient's Body mass index is 30.36 kg/m². BMI is above normal parameters. Recommendations include: nutrition counseling.              Electronically signed by CHON Ansari,  December 14, 2018 2:08 PM

## 2018-12-14 PROBLEM — I25.118 CORONARY ARTERY DISEASE OF NATIVE ARTERY OF NATIVE HEART WITH STABLE ANGINA PECTORIS: Status: ACTIVE | Noted: 2018-12-14

## 2019-06-13 ENCOUNTER — OFFICE VISIT (OUTPATIENT)
Dept: CARDIOLOGY | Facility: CLINIC | Age: 49
End: 2019-06-13

## 2019-06-13 VITALS
BODY MASS INDEX: 28.37 KG/M2 | OXYGEN SATURATION: 98 % | WEIGHT: 176.5 LBS | SYSTOLIC BLOOD PRESSURE: 126 MMHG | HEIGHT: 66 IN | HEART RATE: 81 BPM | DIASTOLIC BLOOD PRESSURE: 82 MMHG

## 2019-06-13 DIAGNOSIS — E78.00 HYPERCHOLESTEREMIA: ICD-10-CM

## 2019-06-13 DIAGNOSIS — R01.1 MURMUR, CARDIAC: ICD-10-CM

## 2019-06-13 DIAGNOSIS — I25.9 IHD (ISCHEMIC HEART DISEASE): Primary | ICD-10-CM

## 2019-06-13 DIAGNOSIS — E78.1 HYPERTRIGLYCERIDEMIA: ICD-10-CM

## 2019-06-13 DIAGNOSIS — I10 ESSENTIAL HYPERTENSION: ICD-10-CM

## 2019-06-13 PROCEDURE — 99213 OFFICE O/P EST LOW 20 MIN: CPT | Performed by: NURSE PRACTITIONER

## 2019-06-13 RX ORDER — LOSARTAN POTASSIUM AND HYDROCHLOROTHIAZIDE 12.5; 5 MG/1; MG/1
1 TABLET ORAL DAILY
Qty: 90 TABLET | Refills: 3 | Status: SHIPPED | OUTPATIENT
Start: 2019-06-13 | End: 2019-10-29 | Stop reason: SDUPTHER

## 2019-06-13 RX ORDER — RANOLAZINE 500 MG/1
500 TABLET, EXTENDED RELEASE ORAL EVERY 12 HOURS SCHEDULED
Qty: 180 TABLET | Refills: 3 | Status: SHIPPED | OUTPATIENT
Start: 2019-06-13 | End: 2020-01-10 | Stop reason: DRUGHIGH

## 2019-06-13 RX ORDER — FENOFIBRATE 67 MG/1
67 CAPSULE ORAL
Qty: 90 CAPSULE | Refills: 3 | Status: SHIPPED | OUTPATIENT
Start: 2019-06-13 | End: 2019-11-01 | Stop reason: SDUPTHER

## 2019-06-13 RX ORDER — ATORVASTATIN CALCIUM 10 MG/1
10 TABLET, FILM COATED ORAL DAILY
Qty: 90 TABLET | Refills: 3 | Status: SHIPPED | OUTPATIENT
Start: 2019-06-13 | End: 2019-10-29 | Stop reason: SDUPTHER

## 2019-06-13 RX ORDER — AMLODIPINE BESYLATE 10 MG/1
10 TABLET ORAL DAILY
Qty: 90 TABLET | Refills: 3 | Status: SHIPPED | OUTPATIENT
Start: 2019-06-13 | End: 2019-11-01 | Stop reason: SDUPTHER

## 2019-06-14 ENCOUNTER — RESULTS ENCOUNTER (OUTPATIENT)
Dept: CARDIOLOGY | Facility: CLINIC | Age: 49
End: 2019-06-14

## 2019-06-14 DIAGNOSIS — I10 ESSENTIAL HYPERTENSION: ICD-10-CM

## 2019-06-14 DIAGNOSIS — E78.00 HYPERCHOLESTEREMIA: ICD-10-CM

## 2019-09-09 ENCOUNTER — TRANSCRIBE ORDERS (OUTPATIENT)
Dept: LAB | Facility: HOSPITAL | Age: 49
End: 2019-09-09

## 2019-09-09 DIAGNOSIS — R07.9 CHEST PAIN, UNSPECIFIED TYPE: Primary | ICD-10-CM

## 2019-09-10 ENCOUNTER — LAB (OUTPATIENT)
Dept: LAB | Facility: HOSPITAL | Age: 49
End: 2019-09-10

## 2019-09-10 DIAGNOSIS — R07.9 CHEST PAIN, UNSPECIFIED TYPE: ICD-10-CM

## 2019-09-10 LAB
ALBUMIN SERPL-MCNC: 5.02 G/DL (ref 3.5–5.2)
ALBUMIN/GLOB SERPL: 2 G/DL
ALP SERPL-CCNC: 77 U/L (ref 39–117)
ALT SERPL W P-5'-P-CCNC: 33 U/L (ref 1–41)
ANION GAP SERPL CALCULATED.3IONS-SCNC: 15.7 MMOL/L (ref 5–15)
AST SERPL-CCNC: 27 U/L (ref 1–40)
BASOPHILS # BLD AUTO: 0.02 10*3/MM3 (ref 0–0.2)
BASOPHILS NFR BLD AUTO: 0.3 % (ref 0–1.5)
BILIRUB SERPL-MCNC: 1 MG/DL (ref 0.2–1.2)
BUN BLD-MCNC: 13 MG/DL (ref 6–20)
BUN/CREAT SERPL: 15.9 (ref 7–25)
CALCIUM SPEC-SCNC: 9.7 MG/DL (ref 8.6–10.5)
CHLORIDE SERPL-SCNC: 102 MMOL/L (ref 98–107)
CK MB SERPL-CCNC: 2.53 NG/ML
CK SERPL-CCNC: 121 U/L (ref 20–200)
CO2 SERPL-SCNC: 22.3 MMOL/L (ref 22–29)
CREAT BLD-MCNC: 0.82 MG/DL (ref 0.76–1.27)
DEPRECATED RDW RBC AUTO: 45.2 FL (ref 37–54)
EOSINOPHIL # BLD AUTO: 0.19 10*3/MM3 (ref 0–0.4)
EOSINOPHIL NFR BLD AUTO: 2.7 % (ref 0.3–6.2)
ERYTHROCYTE [DISTWIDTH] IN BLOOD BY AUTOMATED COUNT: 13.7 % (ref 12.3–15.4)
GFR SERPL CREATININE-BSD FRML MDRD: 100 ML/MIN/1.73
GLOBULIN UR ELPH-MCNC: 2.5 GM/DL
GLUCOSE BLD-MCNC: 91 MG/DL (ref 65–99)
HCT VFR BLD AUTO: 48.8 % (ref 37.5–51)
HGB BLD-MCNC: 16 G/DL (ref 13–17.7)
IMM GRANULOCYTES # BLD AUTO: 0.02 10*3/MM3 (ref 0–0.05)
IMM GRANULOCYTES NFR BLD AUTO: 0.3 % (ref 0–0.5)
LYMPHOCYTES # BLD AUTO: 1.2 10*3/MM3 (ref 0.7–3.1)
LYMPHOCYTES NFR BLD AUTO: 17.4 % (ref 19.6–45.3)
MCH RBC QN AUTO: 30.3 PG (ref 26.6–33)
MCHC RBC AUTO-ENTMCNC: 32.8 G/DL (ref 31.5–35.7)
MCV RBC AUTO: 92.4 FL (ref 79–97)
MONOCYTES # BLD AUTO: 0.71 10*3/MM3 (ref 0.1–0.9)
MONOCYTES NFR BLD AUTO: 10.3 % (ref 5–12)
NEUTROPHILS # BLD AUTO: 4.77 10*3/MM3 (ref 1.7–7)
NEUTROPHILS NFR BLD AUTO: 69 % (ref 42.7–76)
PLATELET # BLD AUTO: 241 10*3/MM3 (ref 140–450)
PMV BLD AUTO: 9.9 FL (ref 6–12)
POTASSIUM BLD-SCNC: 3.2 MMOL/L (ref 3.5–5.2)
PROT SERPL-MCNC: 7.5 G/DL (ref 6–8.5)
RBC # BLD AUTO: 5.28 10*6/MM3 (ref 4.14–5.8)
SODIUM BLD-SCNC: 140 MMOL/L (ref 136–145)
TROPONIN T SERPL-MCNC: <0.01 NG/ML (ref 0–0.03)
TSH SERPL DL<=0.05 MIU/L-ACNC: 1.54 UIU/ML (ref 0.27–4.2)
WBC NRBC COR # BLD: 6.91 10*3/MM3 (ref 3.4–10.8)

## 2019-09-10 PROCEDURE — 82553 CREATINE MB FRACTION: CPT | Performed by: NURSE PRACTITIONER

## 2019-09-10 PROCEDURE — 84443 ASSAY THYROID STIM HORMONE: CPT | Performed by: NURSE PRACTITIONER

## 2019-09-10 PROCEDURE — 36415 COLL VENOUS BLD VENIPUNCTURE: CPT

## 2019-09-10 PROCEDURE — 84484 ASSAY OF TROPONIN QUANT: CPT | Performed by: NURSE PRACTITIONER

## 2019-09-10 PROCEDURE — 80061 LIPID PANEL: CPT | Performed by: NURSE PRACTITIONER

## 2019-09-10 PROCEDURE — 82550 ASSAY OF CK (CPK): CPT | Performed by: NURSE PRACTITIONER

## 2019-09-10 PROCEDURE — 80053 COMPREHEN METABOLIC PANEL: CPT | Performed by: NURSE PRACTITIONER

## 2019-09-10 PROCEDURE — 85025 COMPLETE CBC W/AUTO DIFF WBC: CPT | Performed by: NURSE PRACTITIONER

## 2019-09-11 LAB
CHOLEST SERPL-MCNC: 154 MG/DL (ref 0–200)
HDLC SERPL-MCNC: 42 MG/DL (ref 40–60)
LDLC SERPL CALC-MCNC: 88 MG/DL (ref 0–100)
LDLC/HDLC SERPL: 2.09 {RATIO}
TRIGL SERPL-MCNC: 122 MG/DL (ref 0–150)
VLDLC SERPL-MCNC: 24.4 MG/DL

## 2019-10-29 DIAGNOSIS — E78.00 HYPERCHOLESTEREMIA: ICD-10-CM

## 2019-10-29 DIAGNOSIS — I10 ESSENTIAL HYPERTENSION: ICD-10-CM

## 2019-10-30 RX ORDER — LOSARTAN POTASSIUM AND HYDROCHLOROTHIAZIDE 12.5; 5 MG/1; MG/1
TABLET ORAL
Qty: 90 TABLET | Refills: 0 | Status: SHIPPED | OUTPATIENT
Start: 2019-10-30 | End: 2020-01-06

## 2019-10-30 RX ORDER — ATORVASTATIN CALCIUM 10 MG/1
TABLET, FILM COATED ORAL
Qty: 90 TABLET | Refills: 0 | Status: SHIPPED | OUTPATIENT
Start: 2019-10-30 | End: 2020-01-06

## 2019-11-01 DIAGNOSIS — E78.1 HYPERTRIGLYCERIDEMIA: ICD-10-CM

## 2019-11-01 DIAGNOSIS — I10 ESSENTIAL HYPERTENSION: ICD-10-CM

## 2019-11-01 DIAGNOSIS — I25.9 IHD (ISCHEMIC HEART DISEASE): ICD-10-CM

## 2019-11-01 RX ORDER — AMLODIPINE BESYLATE 10 MG/1
10 TABLET ORAL DAILY
Qty: 90 TABLET | Refills: 0 | Status: SHIPPED | OUTPATIENT
Start: 2019-11-01 | End: 2020-01-06

## 2019-11-01 RX ORDER — FENOFIBRATE 67 MG/1
67 CAPSULE ORAL
Qty: 90 CAPSULE | Refills: 0 | Status: SHIPPED | OUTPATIENT
Start: 2019-11-01 | End: 2020-03-03

## 2019-11-01 NOTE — TELEPHONE ENCOUNTER
Received faxed request from [facility] for refills on amlodipine, fenofibrate, and Brilinta.  Scripts sent to [facility].

## 2019-12-12 ENCOUNTER — OFFICE VISIT (OUTPATIENT)
Dept: CARDIOLOGY | Facility: CLINIC | Age: 49
End: 2019-12-12

## 2019-12-12 VITALS
SYSTOLIC BLOOD PRESSURE: 140 MMHG | WEIGHT: 177 LBS | HEART RATE: 80 BPM | BODY MASS INDEX: 28.45 KG/M2 | HEIGHT: 66 IN | DIASTOLIC BLOOD PRESSURE: 98 MMHG

## 2019-12-12 DIAGNOSIS — E78.00 HYPERCHOLESTEREMIA: ICD-10-CM

## 2019-12-12 DIAGNOSIS — I25.9 IHD (ISCHEMIC HEART DISEASE): ICD-10-CM

## 2019-12-12 DIAGNOSIS — I67.7 CEREBRAL VASCULITIS: ICD-10-CM

## 2019-12-12 DIAGNOSIS — G40.909 SEIZURE DISORDER (HCC): ICD-10-CM

## 2019-12-12 DIAGNOSIS — I10 ESSENTIAL HYPERTENSION: ICD-10-CM

## 2019-12-12 DIAGNOSIS — R01.1 MURMUR, CARDIAC: ICD-10-CM

## 2019-12-12 DIAGNOSIS — I20.8 CHRONIC STABLE ANGINA (HCC): Primary | ICD-10-CM

## 2019-12-12 DIAGNOSIS — M32.9 LUPUS (HCC): ICD-10-CM

## 2019-12-12 DIAGNOSIS — E66.3 OVERWEIGHT (BMI 25.0-29.9): ICD-10-CM

## 2019-12-12 PROCEDURE — 99214 OFFICE O/P EST MOD 30 MIN: CPT | Performed by: NURSE PRACTITIONER

## 2019-12-12 RX ORDER — CARVEDILOL 3.12 MG/1
3.12 TABLET ORAL 2 TIMES DAILY
Qty: 180 TABLET | Refills: 3 | Status: SHIPPED | OUTPATIENT
Start: 2019-12-12 | End: 2020-03-11

## 2019-12-12 RX ORDER — CARVEDILOL 3.12 MG/1
3.12 TABLET ORAL 2 TIMES DAILY
Qty: 180 TABLET | Refills: 3 | Status: SHIPPED | OUTPATIENT
Start: 2019-12-12 | End: 2019-12-12 | Stop reason: SDUPTHER

## 2019-12-12 NOTE — PROGRESS NOTES
Chief Complaint   Patient presents with   • Follow-up     For cardiac management. Patient is on aspirin. Was in  for 14 with complications from lupus. States that BP was over 200 systolic at times. States that when they were taking him off of the vent one day and they are unsure if he had a seizure or a stroke. Will get records from , was flown to [facility]. Last lab work was done this week per PCP, not in chart. Went over medications verbally.    • Chest Pain     States that chest hurts from time to time.    • Hypertension     States that BP was high at PCP, so he was unable to get flu and pneumonia shot.        Cardiac Complaints  chest pressure/discomfort      Subjective   Myron Lackey is a 49 y.o. 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 He was diagnosed with cerebritis at  and was then sent to [facility] in [facility].  He was put on Cytoxan for one year, but now takes CellCept.  He is followed regularly at .  In January of 2018, he underwent cath showing 95% stenosis of left PDA which was stented and PTCA performed on left circumflex.  Later in 2018, he had an event of crushing chest pain an stress and echo were advised. Stress was done as lexiscan due to knee injury.  Ischemia was noted and cath was advised. Cath showed patent stent, and 65% stenosis of RCA with acceptable FFR.    Patient returns today for follow up and admits he was recently at  for about a week. It appears he had a seizure at home witnessed by his wife and EMS was called. He was taken to [facility] where he began to vomit and possibly aspirated. Patient was then airlifted to  for higher level of care. EEG done at  showed no seizure activity but it was felt symptoms could be alcohol withdraw as his MAHSA was high on admission.  He was initially treated with keppra but developed psychosis while  taking and it was stopped as seizure activity not noted. MRI of the head was largely unremarkable.  He was then seen by rheumatology who felt symptoms were not consistent with lupus exacerbation.  Blood pressure was noted to be elevated but did return to baseline when anxiety better managed he was urged to continue current as well as home Brilinta therapy.     He does report continued issues with blood pressure management at home. Patient also reports several times in the hospital at  where his heart rate would be well above 100. He states no medication was changed/added in regards. He does also continue to have chest pain/pressure at times that he describes as an ache over the left chest wall. He does take ranexa therapy in regards to chronic angina as he can not tolerate imdur.  Labs he admits remain followed by PCP and rheumatology whom he follows for lupus. No refills needed.                Cardiac History  Past Surgical History:   Procedure Laterality Date   • CARDIAC CATHETERIZATION  01/29/2018    95% (L) PDA- 2.5x22 Reolute. PTCA of LCX   • CARDIAC CATHETERIZATION      Patent stent, 65% RCA with FFR .85. medcial mgmt   • CARDIOVASCULAR STRESS TEST  12/12/2017    L. Myoview- R/O Inferior Ischemia   • CARDIOVASCULAR STRESS TEST  10/10/2018    L. Cardiolite- EF 52%. Inferior Infarct with Periinfarct Ischemia.   • ECHO - CONVERTED  12/12/2017    EF 65%       Current Outpatient Medications   Medication Sig Dispense Refill   • ALPRAZolam (XANAX) 1 MG tablet Take 1 mg by mouth 3 (Three) Times a Day As Needed for Anxiety     • amLODIPine (NORVASC) 10 MG tablet Take 1 tablet by mouth Daily. 90 tablet 0   • aspirin 81 MG EC tablet Take 81 mg by mouth Daily.     • atorvastatin (LIPITOR) 10 MG tablet TAKE 1 TABLET EVERY DAY 90 tablet 0   • fenofibrate micronized (LOFIBRA) 67 MG capsule Take 1 capsule by mouth Every Morning Before Breakfast. 90 capsule 0   • hydroxychloroquine (PLAQUENIL) 200 MG tablet Take 200 mg by  mouth 2 (Two) Times a Day     • ibuprofen (ADVIL,MOTRIN) 800 MG tablet Take 800 mg by mouth Every 6 (Six) Hours As Needed for Mild Pain .     • losartan-hydrochlorothiazide (HYZAAR) 50-12.5 MG per tablet TAKE 1 TABLET EVERY DAY 90 tablet 0   • mycophenolate (CELLCEPT) 500 MG tablet 6 tablets twice daily     • OXcarbazepine (TRILEPTAL) 300 MG tablet Take 300 mg by mouth 3 (Three) Times a Day.     • ranolazine (RANEXA) 500 MG 12 hr tablet Take 1 tablet by mouth Every 12 (Twelve) Hours. 180 tablet 3   • ticagrelor (BRILINTA) 90 MG tablet tablet Take 1 tablet by mouth 2 (Two) Times a Day. 180 tablet 0   • carvedilol (COREG) 3.125 MG tablet Take 1 tablet by mouth 2 (Two) Times a Day for 90 days. 180 tablet 3     No current facility-administered medications for this visit.        Imdur [isosorbide nitrate] and Lisinopril    Past Medical History:   Diagnosis Date   • Cerebral vasculitis    • History of surgery on arm     both arms   • Hyperlipidemia    • Hypertension    • Stroke (CMS/[facility]) 2014   • Systemic lupus (CMS/[facility])        Social History     Socioeconomic History   • Marital status:      Spouse name: Not on file   • Number of children: Not on file   • Years of education: Not on file   • Highest education level: Not on file   Tobacco Use   • Smoking status: Never Smoker   • Smokeless tobacco: Never Used   Substance and Sexual Activity   • Alcohol use: Yes     Comment: occasional   • Drug use: No       Family History   Problem Relation Age of Onset   • Hypertension Mother    • Hyperlipidemia Mother    • Lung disease Mother    • Heart attack Mother    • Heart disease Mother    • Peripheral vascular disease Mother    • Heart attack Father    • Heart disease Father    • Hypertension Father    • Hyperlipidemia Father    • Heart attack Maternal Uncle    • Heart disease Maternal Uncle    • Hypertension Maternal Uncle    • Hyperlipidemia Maternal Uncle    • Heart attack Paternal Uncle    • Heart disease Paternal Uncle   "  • Hypertension Paternal Uncle    • Hyperlipidemia Paternal Uncle    • Heart disease Maternal Grandmother    • Heart attack Maternal Grandmother    • Hypertension Maternal Grandmother    • Hyperlipidemia Maternal Grandmother    • Heart attack Maternal Grandfather    • Heart disease Maternal Grandfather    • Hypertension Maternal Grandfather    • Hyperlipidemia Maternal Grandfather    • Heart attack Paternal Grandmother    • Heart disease Paternal Grandmother    • Hypertension Paternal Grandmother    • Hyperlipidemia Paternal Grandmother    • Heart attack Paternal Grandfather    • Heart disease Paternal Grandfather    • Hypertension Paternal Grandfather    • Hyperlipidemia Paternal Grandfather        Review of Systems   Constitution: Positive for malaise/fatigue. Negative for night sweats.   Cardiovascular: Positive for chest pain. Negative for claudication, dyspnea on exertion, irregular heartbeat, leg swelling, near-syncope, orthopnea, palpitations and syncope.   Respiratory: Negative for cough, shortness of breath and wheezing.    Musculoskeletal: Positive for joint pain and stiffness. Negative for back pain.   Gastrointestinal: Negative for anorexia, heartburn, nausea and vomiting.   Genitourinary: Negative for dysuria, hematuria, hesitancy and nocturia.   Neurological: Positive for light-headedness. Negative for dizziness and headaches.   Psychiatric/Behavioral: Negative for depression and memory loss. The patient is nervous/anxious.            Objective     /98 (BP Location: Left arm)   Pulse 80   Ht 167.6 cm (65.98\")   Wt 80.3 kg (177 lb)   BMI 28.58 kg/m²     Physical Exam   Constitutional: He is oriented to person, place, and time. He appears well-developed and well-nourished.   HENT:   Head: Normocephalic and atraumatic.   Eyes: Pupils are equal, round, and reactive to light. EOM are normal.   Neck: Normal range of motion. Neck supple.   Cardiovascular: Normal rate and regular rhythm.   Murmur " heard.  Pulmonary/Chest: Effort normal and breath sounds normal.   Abdominal: Soft.   Musculoskeletal: Normal range of motion.   Neurological: He is alert and oriented to person, place, and time.   Skin: Skin is warm and dry.   Psychiatric: He has a normal mood and affect. His behavior is normal.       Procedures    Assessment/Plan     IHD:  On most recent cath a 65% lesion of RCA noted with acceptable FFR. Patient was noted to have patent stents.  He has been maintained on DAPT therapy with Brilinta and ASA which he tolerates well without bleeding or bruising.  Repeat cardiac workup however with stress testing will be advised as he continues to have an ache across his left chest to evaluate for any worsening ischemia, LV dysfunction. More recommendations to follow.    Chronic angina:  Managed with ranexa therapy.  Dose will continue to be 500mg BID as higher dosing caused dizziness. He is intolerant to imdur.     HTN:  Not well managed on current. 140/98.  Since he was also tachycardic in the hospital and chest pain persists, he will be advised to add coreg therapy BID. Limited sodium intake as well as ETOH advised.    Hyperlipidemia:  Most recent labs to chart showed lipids at goal. He will continue with same statin and lofibra therapy as side effects denied.    Lupus:  He takes cellcept and plaquenil in regards, followed by rheumatology.    Hx of seizures:  Seizure activity denied on current trileptal therapy.  Followed by neuro.      BMI noted at 28.58.  Good cardiac diet with limited carbs, calories, fried/fatty foods, and activity as tolerated advised.    6 month follow up recommended or sooner if needed.    Refills managed by your office.          Problems Addressed this Visit        Cardiovascular and Mediastinum    Cerebral vasculitis    Essential hypertension    Relevant Medications    carvedilol (COREG) 3.125 MG tablet    Other Relevant Orders    Stress Test With Myocardial Perfusion One Day     Hypercholesteremia    Murmur, cardiac      Other Visit Diagnoses     Chronic stable angina (CMS/HCC)    -  Primary    Relevant Medications    carvedilol (COREG) 3.125 MG tablet    Other Relevant Orders    Stress Test With Myocardial Perfusion One Day    IHD (ischemic heart disease)        Relevant Medications    carvedilol (COREG) 3.125 MG tablet    Other Relevant Orders    Stress Test With Myocardial Perfusion One Day    Lupus (CMS/HCC)        Overweight (BMI 25.0-29.9)        Seizure disorder (CMS/HCC)              Patient's Body mass index is 28.58 kg/m². BMI is above normal parameters. Recommendations include: nutrition counseling.                Electronically signed by CHON Li December 12, 2019 5:27 PM

## 2020-01-06 DIAGNOSIS — I10 ESSENTIAL HYPERTENSION: ICD-10-CM

## 2020-01-06 DIAGNOSIS — E78.00 HYPERCHOLESTEREMIA: ICD-10-CM

## 2020-01-06 RX ORDER — LOSARTAN POTASSIUM AND HYDROCHLOROTHIAZIDE 12.5; 5 MG/1; MG/1
TABLET ORAL
Qty: 90 TABLET | Refills: 1 | Status: SHIPPED | OUTPATIENT
Start: 2020-01-06 | End: 2020-06-16

## 2020-01-06 RX ORDER — ATORVASTATIN CALCIUM 10 MG/1
TABLET, FILM COATED ORAL
Qty: 90 TABLET | Refills: 1 | Status: SHIPPED | OUTPATIENT
Start: 2020-01-06 | End: 2020-06-22 | Stop reason: SDUPTHER

## 2020-01-06 RX ORDER — AMLODIPINE BESYLATE 10 MG/1
TABLET ORAL
Qty: 90 TABLET | Refills: 1 | Status: SHIPPED | OUTPATIENT
Start: 2020-01-06 | End: 2020-06-22 | Stop reason: SDUPTHER

## 2020-01-09 ENCOUNTER — HOSPITAL ENCOUNTER (OUTPATIENT)
Dept: CARDIOLOGY | Facility: HOSPITAL | Age: 50
Discharge: HOME | End: 2020-01-09

## 2020-01-09 DIAGNOSIS — I20.8 CHRONIC STABLE ANGINA (HCC): ICD-10-CM

## 2020-01-09 DIAGNOSIS — I10 ESSENTIAL HYPERTENSION: ICD-10-CM

## 2020-01-09 DIAGNOSIS — I25.9 IHD (ISCHEMIC HEART DISEASE): ICD-10-CM

## 2020-01-09 PROCEDURE — 0 TECHNETIUM SESTAMIBI: Performed by: INTERNAL MEDICINE

## 2020-01-09 PROCEDURE — 93016 CV STRESS TEST SUPVJ ONLY: CPT | Performed by: NURSE PRACTITIONER

## 2020-01-09 PROCEDURE — 78452 HT MUSCLE IMAGE SPECT MULT: CPT

## 2020-01-09 PROCEDURE — 93017 CV STRESS TEST TRACING ONLY: CPT

## 2020-01-09 PROCEDURE — A9500 TC99M SESTAMIBI: HCPCS | Performed by: INTERNAL MEDICINE

## 2020-01-09 PROCEDURE — 93018 CV STRESS TEST I&R ONLY: CPT | Performed by: INTERNAL MEDICINE

## 2020-01-09 PROCEDURE — 78452 HT MUSCLE IMAGE SPECT MULT: CPT | Performed by: INTERNAL MEDICINE

## 2020-01-09 PROCEDURE — 25010000002 REGADENOSON 0.4 MG/5ML SOLUTION: Performed by: INTERNAL MEDICINE

## 2020-01-09 RX ADMIN — REGADENOSON 0.4 MG: 0.08 INJECTION, SOLUTION INTRAVENOUS at 09:45

## 2020-01-09 RX ADMIN — TECHNETIUM TC 99M SESTAMIBI 1 DOSE: 1 INJECTION INTRAVENOUS at 08:11

## 2020-01-09 RX ADMIN — TECHNETIUM TC 99M SESTAMIBI 1 DOSE: 1 INJECTION INTRAVENOUS at 09:45

## 2020-01-10 ENCOUNTER — TELEPHONE (OUTPATIENT)
Dept: CARDIOLOGY | Facility: CLINIC | Age: 50
End: 2020-01-10

## 2020-01-10 LAB
BH CV NUCLEAR PRIOR STUDY: 3
BH CV STRESS COMMENTS STAGE 1: NORMAL
BH CV STRESS DOSE REGADENOSON STAGE 1: 0.4
BH CV STRESS DURATION MIN STAGE 1: 0
BH CV STRESS DURATION SEC STAGE 1: 10
BH CV STRESS PROTOCOL 1: NORMAL
BH CV STRESS RECOVERY BP: NORMAL MMHG
BH CV STRESS RECOVERY HR: 91 BPM
BH CV STRESS STAGE 1: 1
LV EF NUC BP: 45 %
MAXIMAL PREDICTED HEART RATE: 171 BPM
PERCENT MAX PREDICTED HR: 60.23 %
STRESS BASELINE BP: NORMAL MMHG
STRESS BASELINE HR: 65 BPM
STRESS PERCENT HR: 71 %
STRESS POST PEAK BP: NORMAL MMHG
STRESS POST PEAK HR: 103 BPM
STRESS TARGET HR: 145 BPM

## 2020-01-10 RX ORDER — RANOLAZINE 1000 MG/1
1000 TABLET, EXTENDED RELEASE ORAL EVERY 12 HOURS SCHEDULED
Qty: 180 TABLET | Refills: 3 | Status: SHIPPED | OUTPATIENT
Start: 2020-01-10 | End: 2020-06-22 | Stop reason: SDUPTHER

## 2020-01-10 NOTE — TELEPHONE ENCOUNTER
Patient was made aware of results of stress test. EF 45% and infarct with ischemia. Patient was made aware to increase Ranexa to 1000 mg BID. He was made aware that if he does not notice improvement of chest pain with increased dose to call back to schedule cath. Patient verbalized understanding.     Script sent to [facility] for Ranexa 1000 mg BID with 180 tablets and 3 refills. Until patient receives prescription from pharmacy he is going to take 2 tablets of the 500 mg Ranexa BID.

## 2020-01-10 NOTE — TELEPHONE ENCOUNTER
----- Message from CHON Ovalle sent at 1/10/2020  9:21 AM EST -----  EF 45%, increase ranexa 1000mg BID.  Infarct with ischemia.  If symptoms persist with change, cath.

## 2020-03-02 DIAGNOSIS — I25.9 IHD (ISCHEMIC HEART DISEASE): ICD-10-CM

## 2020-03-02 DIAGNOSIS — E78.1 HYPERTRIGLYCERIDEMIA: ICD-10-CM

## 2020-03-03 RX ORDER — FENOFIBRATE 67 MG/1
CAPSULE ORAL
Qty: 90 CAPSULE | Refills: 3 | Status: SHIPPED | OUTPATIENT
Start: 2020-03-03 | End: 2020-06-22 | Stop reason: SDUPTHER

## 2020-03-03 RX ORDER — TICAGRELOR 90 MG/1
TABLET ORAL
Qty: 180 TABLET | Refills: 3 | Status: SHIPPED | OUTPATIENT
Start: 2020-03-03 | End: 2020-06-22 | Stop reason: DRUGHIGH

## 2020-06-15 DIAGNOSIS — I10 ESSENTIAL HYPERTENSION: ICD-10-CM

## 2020-06-16 RX ORDER — LOSARTAN POTASSIUM AND HYDROCHLOROTHIAZIDE 12.5; 5 MG/1; MG/1
TABLET ORAL
Qty: 90 TABLET | Refills: 1 | Status: SHIPPED | OUTPATIENT
Start: 2020-06-16 | End: 2020-06-22 | Stop reason: SDUPTHER

## 2020-06-22 ENCOUNTER — OFFICE VISIT (OUTPATIENT)
Dept: CARDIOLOGY | Facility: CLINIC | Age: 50
End: 2020-06-22

## 2020-06-22 VITALS
SYSTOLIC BLOOD PRESSURE: 126 MMHG | BODY MASS INDEX: 31.72 KG/M2 | HEIGHT: 66 IN | RESPIRATION RATE: 12 BRPM | WEIGHT: 197.4 LBS | TEMPERATURE: 98.2 F | HEART RATE: 80 BPM | DIASTOLIC BLOOD PRESSURE: 90 MMHG

## 2020-06-22 DIAGNOSIS — I25.118 CORONARY ARTERY DISEASE OF NATIVE ARTERY OF NATIVE HEART WITH STABLE ANGINA PECTORIS (HCC): Primary | ICD-10-CM

## 2020-06-22 DIAGNOSIS — R07.89 CHEST PRESSURE: ICD-10-CM

## 2020-06-22 DIAGNOSIS — E78.1 HYPERTRIGLYCERIDEMIA: ICD-10-CM

## 2020-06-22 DIAGNOSIS — I10 ESSENTIAL HYPERTENSION: ICD-10-CM

## 2020-06-22 DIAGNOSIS — R06.02 SHORTNESS OF BREATH: ICD-10-CM

## 2020-06-22 DIAGNOSIS — R60.0 BILATERAL LEG EDEMA: ICD-10-CM

## 2020-06-22 DIAGNOSIS — E78.00 HYPERCHOLESTEREMIA: ICD-10-CM

## 2020-06-22 PROCEDURE — 99214 OFFICE O/P EST MOD 30 MIN: CPT | Performed by: NURSE PRACTITIONER

## 2020-06-22 PROCEDURE — 93000 ELECTROCARDIOGRAM COMPLETE: CPT | Performed by: NURSE PRACTITIONER

## 2020-06-22 RX ORDER — CARVEDILOL 3.12 MG/1
3.12 TABLET ORAL 2 TIMES DAILY WITH MEALS
COMMUNITY
End: 2020-06-22 | Stop reason: SDUPTHER

## 2020-06-22 RX ORDER — FENOFIBRATE 67 MG/1
67 CAPSULE ORAL
Qty: 90 CAPSULE | Refills: 3 | Status: SHIPPED | OUTPATIENT
Start: 2020-06-22 | End: 2021-01-29

## 2020-06-22 RX ORDER — MYCOPHENOLATE MOFETIL 200 MG/ML
1000 POWDER, FOR SUSPENSION ORAL
COMMUNITY

## 2020-06-22 RX ORDER — CARVEDILOL 6.25 MG/1
6.25 TABLET ORAL 2 TIMES DAILY WITH MEALS
Qty: 180 TABLET | Refills: 3 | Status: SHIPPED | OUTPATIENT
Start: 2020-06-22 | End: 2020-08-14 | Stop reason: ALTCHOICE

## 2020-06-22 RX ORDER — RANOLAZINE 1000 MG/1
1000 TABLET, EXTENDED RELEASE ORAL EVERY 12 HOURS SCHEDULED
Qty: 180 TABLET | Refills: 3 | Status: SHIPPED | OUTPATIENT
Start: 2020-06-22 | End: 2020-11-18 | Stop reason: SDUPTHER

## 2020-06-22 RX ORDER — ATORVASTATIN CALCIUM 10 MG/1
10 TABLET, FILM COATED ORAL DAILY
Qty: 90 TABLET | Refills: 3 | Status: SHIPPED | OUTPATIENT
Start: 2020-06-22 | End: 2020-08-14 | Stop reason: DRUGHIGH

## 2020-06-22 RX ORDER — LOSARTAN POTASSIUM AND HYDROCHLOROTHIAZIDE 12.5; 5 MG/1; MG/1
1 TABLET ORAL DAILY
Qty: 90 TABLET | Refills: 3 | Status: SHIPPED | OUTPATIENT
Start: 2020-06-22 | End: 2020-11-18 | Stop reason: SDUPTHER

## 2020-06-22 RX ORDER — ASPIRIN 81 MG/1
81 TABLET ORAL DAILY
Qty: 90 TABLET | Refills: 3 | Status: SHIPPED | OUTPATIENT
Start: 2020-06-22 | End: 2020-11-17 | Stop reason: SDUPTHER

## 2020-06-22 RX ORDER — AMLODIPINE BESYLATE 10 MG/1
10 TABLET ORAL DAILY
Qty: 90 TABLET | Refills: 3 | Status: SHIPPED | OUTPATIENT
Start: 2020-06-22 | End: 2020-11-18 | Stop reason: SDUPTHER

## 2020-06-22 NOTE — PROGRESS NOTES
Chief Complaint   Patient presents with   • Follow-up     for cardiac management   • Shortness of Breath     due to COPD   • Dizziness     occured x3 weeks ago while hanging lights. Reports near syncope, nausea, profuse sweating. Pt did not f/u   • Med Management     [facility] mail order. List provided.     Subjective       Myron Lackey is a 49 y.o. 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 He was diagnosed with cerebritis at  and was then sent to [facility] in [facility].  He was put on Cytoxan for one year, but now takes CellCept.  He is followed regularly at .  In January of 2018, he underwent cath showing 95% stenosis of left PDA which was stented and PTCA performed on left circumflex.  Later in 2018, he had an event of crushing chest pain an stress and echo were advised. Stress was done as Lexiscan due to knee injury.  Ischemia was noted and cath was advised. Cath showed patent stent, and 65% stenosis of RCA with acceptable FFR.    Late 2019 he was admitted to  with seizure activity felt to be possibly related to alcohol withdrawal. He was treated with Keppra but developed psychosis and was stopped as he had no further seizure activity and EEG, MRI normal. He was seen by rheumatology who felt symptoms were not related to lupus exacerbation. His blood pressure and heart rate were elevated during this time as well as at follow up. Coreg added. Stress test repeated 1/9/20 showed EF 45%, inferoseptal infarct with ranjeet-infarct ischemia. Ranexa increased to 1000 mg BID. Cardiac cath recommended if patient had worsening chest pain.     He returns today for follow up. Since his last visit, he has had two episodes of significant symptoms. Once while he was on a ladder, working overhead, hanging lights, he suddenly felt dizzy, SOB, weak, nauseated with profuse sweating.  After he sat down for a few minutes, symptoms resolved.  Another episode occurred while outside doing yard work. His legs became extremely weak where he could hardly stand. His son brought him some water, he sat down and the episode subsided. During both episodes, he did not have chest pain. Blood pressure remains slightly elevated at home with systolic 130-150, diastolic 85-90. Chest pain has been well controlled since increasing Ranexa to 1000 mg. Lipids well controlled 9/2019 with , Tri 122, HDL 42, LDL 88.     HPI         Cardiac History:    Past Surgical History:   Procedure Laterality Date   • CARDIAC CATHETERIZATION  01/29/2018    95% (L) PDA- 2.5x22 Reolute. PTCA of LCX   • CARDIAC CATHETERIZATION  10/17/2018    Patent stent, 65% RCA with FFR .85. medcial mgmt   • CARDIOVASCULAR STRESS TEST  12/12/2017    L. Myoview- R/O Inferior Ischemia   • CARDIOVASCULAR STRESS TEST  10/10/2018    L. Cardiolite- EF 52%. Inferior Infarct with Periinfarct Ischemia.   • CARDIOVASCULAR STRESS TEST  01/09/2020    L. Cardiolite- EF 45%. Inferoseptal Infarct with periinfarct Ischemia.   • ECHO - CONVERTED  12/12/2017    EF 65%       Current Outpatient Medications   Medication Sig Dispense Refill   • ALPRAZolam (XANAX) 1 MG tablet Take 1 mg by mouth 3 (Three) Times a Day As Needed for Anxiety     • amLODIPine (NORVASC) 10 MG tablet Take 1 tablet by mouth Daily. 90 tablet 3   • aspirin 81 MG EC tablet Take 1 tablet by mouth Daily. 90 tablet 3   • atorvastatin (LIPITOR) 10 MG tablet Take 1 tablet by mouth Daily. 90 tablet 3   • carvedilol (COREG) 6.25 MG tablet Take 1 tablet by mouth 2 (Two) Times a Day With Meals. 180 tablet 3   • fenofibrate micronized (LOFIBRA) 67 MG capsule Take 1 capsule by mouth Every Morning Before Breakfast. 90 capsule 3   • hydroxychloroquine (PLAQUENIL) 200 MG tablet Take 200 mg by mouth 2 (Two) Times a Day     • losartan-hydrochlorothiazide (HYZAAR) 50-12.5 MG per tablet Take 1 tablet by mouth  Daily. 90 tablet 3   • mycophenolate (CELLCEPT) 200 MG/ML suspension Take 1,000 mg by mouth 2 (Two) Times a Day.     • OXcarbazepine (TRILEPTAL) 300 MG tablet Take 300 mg by mouth 3 (Three) Times a Day.     • ranolazine (Ranexa) 1000 MG 12 hr tablet Take 1 tablet by mouth Every 12 (Twelve) Hours. 180 tablet 3   • ibuprofen (ADVIL,MOTRIN) 800 MG tablet Take 800 mg by mouth Every 6 (Six) Hours As Needed for Mild Pain .     • mycophenolate (CELLCEPT) 500 MG tablet 6 tablets twice daily     • ticagrelor (Brilinta) 60 MG tablet tablet Take 1 tablet by mouth 2 (Two) Times a Day. 180 tablet 3     No current facility-administered medications for this visit.        Imdur [isosorbide nitrate]; Keppra [levetiracetam]; and Lisinopril    Past Medical History:   Diagnosis Date   • Cerebral vasculitis    • History of surgery on arm     both arms   • Hyperlipidemia    • Hypertension    • Stroke (CMS/[facility]) 2014   • Systemic lupus (CMS/[facility])        Social History     Socioeconomic History   • Marital status:      Spouse name: Not on file   • Number of children: Not on file   • Years of education: Not on file   • Highest education level: Not on file   Tobacco Use   • Smoking status: Never Smoker   • Smokeless tobacco: Never Used   Substance and Sexual Activity   • Alcohol use: Yes     Comment: occasional   • Drug use: No     Family History   Problem Relation Age of Onset   • Hypertension Mother    • Hyperlipidemia Mother    • Lung disease Mother    • Heart attack Mother    • Heart disease Mother    • Peripheral vascular disease Mother    • Heart attack Father    • Heart disease Father    • Hypertension Father    • Hyperlipidemia Father    • Heart attack Maternal Uncle    • Heart disease Maternal Uncle    • Hypertension Maternal Uncle    • Hyperlipidemia Maternal Uncle    • Heart attack Paternal Uncle    • Heart disease Paternal Uncle    • Hypertension Paternal Uncle    • Hyperlipidemia Paternal Uncle    • Heart disease Maternal  "Grandmother    • Heart attack Maternal Grandmother    • Hypertension Maternal Grandmother    • Hyperlipidemia Maternal Grandmother    • Heart attack Maternal Grandfather    • Heart disease Maternal Grandfather    • Hypertension Maternal Grandfather    • Hyperlipidemia Maternal Grandfather    • Heart attack Paternal Grandmother    • Heart disease Paternal Grandmother    • Hypertension Paternal Grandmother    • Hyperlipidemia Paternal Grandmother    • Heart attack Paternal Grandfather    • Heart disease Paternal Grandfather    • Hypertension Paternal Grandfather    • Hyperlipidemia Paternal Grandfather        Review of Systems   Constitution: Positive for malaise/fatigue and weight gain (increased 20 lb in 6 months). Negative for decreased appetite.   HENT: Negative.    Eyes: Negative.    Cardiovascular: Positive for dyspnea on exertion and leg swelling. Negative for chest pain, orthopnea, palpitations and syncope.   Respiratory: Negative for cough.    Endocrine: Negative.    Hematologic/Lymphatic: Negative.    Skin: Negative.    Musculoskeletal: Negative for myalgias.   Gastrointestinal: Negative for abdominal pain and melena.   Genitourinary: Negative for dysuria and hematuria.   Neurological: Positive for dizziness and seizures (none recent, follows at  ).   Psychiatric/Behavioral: Negative for altered mental status and depression.   Allergic/Immunologic: Negative.       Diabetes- No  Thyroid-normal    Objective     /90 (BP Location: Left arm, Patient Position: Sitting, Cuff Size: Adult)   Pulse 80   Temp 98.2 °F (36.8 °C)   Resp 12   Ht 167.6 cm (65.98\")   Wt 89.5 kg (197 lb 6.4 oz)   BMI 31.88 kg/m²     Physical Exam   Constitutional: He is oriented to person, place, and time. He appears well-developed and well-nourished. No distress.   HENT:   Head: Normocephalic.   Eyes: Pupils are equal, round, and reactive to light.   Neck: Normal range of motion.   Cardiovascular: Normal rate, regular rhythm " and intact distal pulses.   Murmur heard.  Pulmonary/Chest: Effort normal and breath sounds normal. No respiratory distress. He has no wheezes. He has no rales.   Abdominal: Soft. Bowel sounds are normal.   Musculoskeletal: Normal range of motion. He exhibits edema (1+ bilateral ankle edema ).   Neurological: He is alert and oriented to person, place, and time.   Skin: Skin is warm and dry. He is not diaphoretic.   Psychiatric: He has a normal mood and affect.   Nursing note and vitals reviewed.       ECG 12 Lead  Date/Time: 6/22/2020 11:23 AM  Performed by: Patricia Puente APRN  Authorized by: Patricia Puente APRN   Comparison: compared with previous ECG from 12/5/2017  Similar to previous ECG  Rhythm: sinus rhythm  Rate: normal  BPM: 62    Clinical impression: normal ECG                Assessment/Plan      Myron was seen today for follow-up, shortness of breath, dizziness and med management.    Diagnoses and all orders for this visit:    Coronary artery disease of native artery of native heart with stable angina pectoris (CMS/[facility])  -     ranolazine (Ranexa) 1000 MG 12 hr tablet; Take 1 tablet by mouth Every 12 (Twelve) Hours.  -     aspirin 81 MG EC tablet; Take 1 tablet by mouth Daily.  -     ticagrelor (Brilinta) 60 MG tablet tablet; Take 1 tablet by mouth 2 (Two) Times a Day.  -     Comprehensive Metabolic Panel; Future  -     Magnesium; Future    Hypercholesteremia  -     atorvastatin (LIPITOR) 10 MG tablet; Take 1 tablet by mouth Daily.  -     Comprehensive Metabolic Panel; Future  -     Lipid Panel; Future    Essential hypertension  -     carvedilol (COREG) 6.25 MG tablet; Take 1 tablet by mouth 2 (Two) Times a Day With Meals.  -     losartan-hydrochlorothiazide (HYZAAR) 50-12.5 MG per tablet; Take 1 tablet by mouth Daily.  -     amLODIPine (NORVASC) 10 MG tablet; Take 1 tablet by mouth Daily.  -     Comprehensive Metabolic Panel; Future  -     Magnesium; Future  -     TSH; Future    Hypertriglyceridemia  -      fenofibrate micronized (LOFIBRA) 67 MG capsule; Take 1 capsule by mouth Every Morning Before Breakfast.  -     Comprehensive Metabolic Panel; Future  -     Lipid Panel; Future  -     TSH; Future    Shortness of breath  -     CBC (No Diff); Future  -     Comprehensive Metabolic Panel; Future  -     proBNP; Future    Chest pressure  -     ranolazine (Ranexa) 1000 MG 12 hr tablet; Take 1 tablet by mouth Every 12 (Twelve) Hours.  -     Comprehensive Metabolic Panel; Future  -     Magnesium; Future    Bilateral leg edema  -     proBNP; Future    1. CAD- s/p stenting of PDA, last cath showed 65% RCA with acceptable FFR. I explained these findings in detail. He is managed with Ranexa, bb, statin, aspirin, Brilinta. Reduce Brilinta to maintenance dose of 60 mg BID. We reviewed nuclear stress 1/2020 showing similar findings of inferoseptal infarct with ranjeet-infarct ischemia. He will continue medical management at this time but understands to report any anginal symptoms. He agrees to undergo repeat cath if symptoms worsens. He did not tolerate Imdur and has not required sl nitro.     2. Hypercholesterolemia well controlled with atorvastatin and fenofibrate. LDL 88 but HDL increased to 42 with LDL/HDL ratio improved. Will recheck lipids. If LDL increases, recommend increasing Lipitor to 20 mg. Trigs normal now with fenofibrate. Low cholesterol, low sugar diet. He admits to going back on Coke.      3. HTN- remains elevated today, as well as high readings at home. He is advised to increase carvedilol to 6.25 mg BID. Monitor heart rate and blood pressure and report back if not improved. Limit sodium. Weight loss encouraged.     4. Bilateral leg edema- may be r/t medication side effect, amlodipine or Cellcept. Will check CMP, BNP to evaluate for any volume overload as his EF was 45% on recent stress.      5. Cerebral vasculitis- followed by neurology at .     Further recommendations to follow. We will see him back in six months  or sooner as needed.         Patient's Body mass index is 31.88 kg/m². BMI is above normal parameters. Recommendations include: nutrition counseling.               Electronically signed by CHON Ansari,  June 22, 2020 11:12

## 2020-06-23 ENCOUNTER — LAB (OUTPATIENT)
Dept: LAB | Facility: HOSPITAL | Age: 50
End: 2020-06-23

## 2020-06-23 DIAGNOSIS — R07.89 CHEST PRESSURE: ICD-10-CM

## 2020-06-23 DIAGNOSIS — I25.118 CORONARY ARTERY DISEASE OF NATIVE ARTERY OF NATIVE HEART WITH STABLE ANGINA PECTORIS (HCC): ICD-10-CM

## 2020-06-23 DIAGNOSIS — E78.1 HYPERTRIGLYCERIDEMIA: ICD-10-CM

## 2020-06-23 DIAGNOSIS — I10 ESSENTIAL HYPERTENSION: ICD-10-CM

## 2020-06-23 DIAGNOSIS — E78.00 HYPERCHOLESTEREMIA: ICD-10-CM

## 2020-06-23 DIAGNOSIS — R60.0 BILATERAL LEG EDEMA: ICD-10-CM

## 2020-06-23 DIAGNOSIS — R06.02 SHORTNESS OF BREATH: ICD-10-CM

## 2020-06-23 LAB
ALBUMIN SERPL-MCNC: 4.83 G/DL (ref 3.5–5.2)
ALBUMIN/GLOB SERPL: 2.2 G/DL
ALP SERPL-CCNC: 81 U/L (ref 39–117)
ALT SERPL W P-5'-P-CCNC: 72 U/L (ref 1–41)
ANION GAP SERPL CALCULATED.3IONS-SCNC: 14.8 MMOL/L (ref 5–15)
AST SERPL-CCNC: 46 U/L (ref 1–40)
BILIRUB SERPL-MCNC: 0.7 MG/DL (ref 0.2–1.2)
BUN BLD-MCNC: 12 MG/DL (ref 6–20)
BUN/CREAT SERPL: 13.8 (ref 7–25)
CALCIUM SPEC-SCNC: 9.5 MG/DL (ref 8.6–10.5)
CHLORIDE SERPL-SCNC: 104 MMOL/L (ref 98–107)
CHOLEST SERPL-MCNC: 155 MG/DL (ref 0–200)
CO2 SERPL-SCNC: 21.2 MMOL/L (ref 22–29)
CREAT BLD-MCNC: 0.87 MG/DL (ref 0.76–1.27)
DEPRECATED RDW RBC AUTO: 47.6 FL (ref 37–54)
ERYTHROCYTE [DISTWIDTH] IN BLOOD BY AUTOMATED COUNT: 13.4 % (ref 12.3–15.4)
GFR SERPL CREATININE-BSD FRML MDRD: 93 ML/MIN/1.73
GLOBULIN UR ELPH-MCNC: 2.2 GM/DL
GLUCOSE BLD-MCNC: 98 MG/DL (ref 65–99)
HCT VFR BLD AUTO: 48.1 % (ref 37.5–51)
HDLC SERPL-MCNC: 34 MG/DL (ref 40–60)
HGB BLD-MCNC: 15.7 G/DL (ref 13–17.7)
LDLC SERPL CALC-MCNC: 64 MG/DL (ref 0–100)
LDLC/HDLC SERPL: 1.89 {RATIO}
MAGNESIUM SERPL-MCNC: 2.3 MG/DL (ref 1.6–2.6)
MCH RBC QN AUTO: 31.1 PG (ref 26.6–33)
MCHC RBC AUTO-ENTMCNC: 32.6 G/DL (ref 31.5–35.7)
MCV RBC AUTO: 95.2 FL (ref 79–97)
NT-PROBNP SERPL-MCNC: 27.6 PG/ML (ref 5–450)
PLATELET # BLD AUTO: 244 10*3/MM3 (ref 140–450)
PMV BLD AUTO: 9.8 FL (ref 6–12)
POTASSIUM BLD-SCNC: 3.9 MMOL/L (ref 3.5–5.2)
PROT SERPL-MCNC: 7 G/DL (ref 6–8.5)
RBC # BLD AUTO: 5.05 10*6/MM3 (ref 4.14–5.8)
SODIUM BLD-SCNC: 140 MMOL/L (ref 136–145)
TRIGL SERPL-MCNC: 283 MG/DL (ref 0–150)
TSH SERPL DL<=0.05 MIU/L-ACNC: 2.14 UIU/ML (ref 0.27–4.2)
VLDLC SERPL-MCNC: 56.6 MG/DL
WBC NRBC COR # BLD: 5.03 10*3/MM3 (ref 3.4–10.8)

## 2020-06-23 PROCEDURE — 80061 LIPID PANEL: CPT | Performed by: NURSE PRACTITIONER

## 2020-06-23 PROCEDURE — 84443 ASSAY THYROID STIM HORMONE: CPT | Performed by: NURSE PRACTITIONER

## 2020-06-23 PROCEDURE — 36415 COLL VENOUS BLD VENIPUNCTURE: CPT

## 2020-06-23 PROCEDURE — 85027 COMPLETE CBC AUTOMATED: CPT | Performed by: NURSE PRACTITIONER

## 2020-06-23 PROCEDURE — 83735 ASSAY OF MAGNESIUM: CPT | Performed by: NURSE PRACTITIONER

## 2020-06-23 PROCEDURE — 80053 COMPREHEN METABOLIC PANEL: CPT | Performed by: NURSE PRACTITIONER

## 2020-06-23 PROCEDURE — 83880 ASSAY OF NATRIURETIC PEPTIDE: CPT | Performed by: NURSE PRACTITIONER

## 2020-07-07 ENCOUNTER — TELEPHONE (OUTPATIENT)
Dept: CARDIOLOGY | Facility: CLINIC | Age: 50
End: 2020-07-07

## 2020-07-07 ENCOUNTER — OUTSIDE FACILITY SERVICE (OUTPATIENT)
Dept: CARDIOLOGY | Facility: CLINIC | Age: 50
End: 2020-07-07

## 2020-07-07 PROCEDURE — 93308 TTE F-UP OR LMTD: CPT | Performed by: INTERNAL MEDICINE

## 2020-07-07 PROCEDURE — 93325 DOPPLER ECHO COLOR FLOW MAPG: CPT | Performed by: INTERNAL MEDICINE

## 2020-07-07 PROCEDURE — 99223 1ST HOSP IP/OBS HIGH 75: CPT | Performed by: INTERNAL MEDICINE

## 2020-07-07 PROCEDURE — 93321 DOPPLER ECHO F-UP/LMTD STD: CPT | Performed by: INTERNAL MEDICINE

## 2020-07-07 NOTE — TELEPHONE ENCOUNTER
"Patient called stating \"I am having cramping chest pain, nausea and just so short of breath. I have placed 2 Nitro patches and still having symptoms\". Advised patient to go to ER.  "

## 2020-07-08 ENCOUNTER — OUTSIDE FACILITY SERVICE (OUTPATIENT)
Dept: CARDIOLOGY | Facility: CLINIC | Age: 50
End: 2020-07-08

## 2020-07-08 PROCEDURE — 93458 L HRT ARTERY/VENTRICLE ANGIO: CPT | Performed by: INTERNAL MEDICINE

## 2020-07-08 PROCEDURE — 99232 SBSQ HOSP IP/OBS MODERATE 35: CPT | Performed by: INTERNAL MEDICINE

## 2020-07-09 ENCOUNTER — OUTSIDE FACILITY SERVICE (OUTPATIENT)
Dept: CARDIOLOGY | Facility: CLINIC | Age: 50
End: 2020-07-09

## 2020-07-09 PROCEDURE — 99232 SBSQ HOSP IP/OBS MODERATE 35: CPT | Performed by: INTERNAL MEDICINE

## 2020-08-14 ENCOUNTER — OFFICE VISIT (OUTPATIENT)
Dept: CARDIOLOGY | Facility: CLINIC | Age: 50
End: 2020-08-14

## 2020-08-14 VITALS
DIASTOLIC BLOOD PRESSURE: 70 MMHG | WEIGHT: 188.2 LBS | BODY MASS INDEX: 30.25 KG/M2 | TEMPERATURE: 97.5 F | HEART RATE: 60 BPM | HEIGHT: 66 IN | SYSTOLIC BLOOD PRESSURE: 100 MMHG

## 2020-08-14 DIAGNOSIS — E78.00 HYPERCHOLESTEREMIA: Primary | ICD-10-CM

## 2020-08-14 DIAGNOSIS — I25.118 CORONARY ARTERY DISEASE OF NATIVE ARTERY OF NATIVE HEART WITH STABLE ANGINA PECTORIS (HCC): ICD-10-CM

## 2020-08-14 DIAGNOSIS — I10 ESSENTIAL HYPERTENSION: ICD-10-CM

## 2020-08-14 DIAGNOSIS — Z79.899 LONG-TERM USE OF HIGH-RISK MEDICATION: ICD-10-CM

## 2020-08-14 DIAGNOSIS — R74.8 ELEVATED LIVER ENZYMES: ICD-10-CM

## 2020-08-14 PROCEDURE — 99214 OFFICE O/P EST MOD 30 MIN: CPT | Performed by: NURSE PRACTITIONER

## 2020-08-14 RX ORDER — CARVEDILOL 3.12 MG/1
3.12 TABLET ORAL 2 TIMES DAILY WITH MEALS
COMMUNITY
End: 2020-08-14 | Stop reason: ALTCHOICE

## 2020-08-14 RX ORDER — CARVEDILOL 6.25 MG/1
6.25 TABLET ORAL 2 TIMES DAILY WITH MEALS
COMMUNITY
End: 2020-11-18 | Stop reason: SDUPTHER

## 2020-08-14 RX ORDER — ATORVASTATIN CALCIUM 40 MG/1
40 TABLET, FILM COATED ORAL DAILY
Qty: 90 TABLET | Refills: 3 | Status: SHIPPED | OUTPATIENT
Start: 2020-08-14 | End: 2020-08-14 | Stop reason: SDUPTHER

## 2020-08-14 RX ORDER — ATORVASTATIN CALCIUM 40 MG/1
40 TABLET, FILM COATED ORAL DAILY
Qty: 30 TABLET | Refills: 0 | Status: SHIPPED | OUTPATIENT
Start: 2020-08-14 | End: 2020-11-18 | Stop reason: SDUPTHER

## 2020-08-14 NOTE — PROGRESS NOTES
Chief Complaint   Patient presents with   • Hospital follow up     Patient had cardiac cath with one stent placed.   • Weight Loss     Decreased soda intake, and eating more fish.   • Shortness of Breath     Has stopped since stent placed.   • Chest Pain     will occasionally have mid chest pressure, feels could be related to arthritis.   • Blood pressure     Elevated last week while at doctor office visit B/P 128/94.     Subjective       Myron Lackey is a 49 y.o. 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 He follows with  managed with [employer]. In January 2018, he had stenting of 95% L PDA, and PTCA of LCX. Later in 2018, stress test followed by cath for recurrent CP patent stent, and 65% stenosis of RCA with acceptable FFR.     Late 2019 he was admitted to  with seizure activity felt to be possibly related to alcohol withdrawal. He was treated with Keppra but developed psychosis and was stopped as he had no further seizure activity and EEG, MRI normal. He was seen by rheumatology who felt symptoms were not related to lupus exacerbation. His blood pressure and heart rate were elevated during this time as well as at follow up. Coreg added. Stress test repeated 1/9/20 showed EF 45%, inferoseptal infarct with ranjeet-infarct ischemia. Ranexa increased to 1000 mg BID. Cardiac cath recommended if patient had worsening chest pain.     Then in July 2020, he developed significant chest discomfort and dyspnea. Presented to ED with no EKG changes or elevated troponin. Since his chest pain responded to IV nitro, it was decided to proceed with cath. He was noted to have 99% distal RCA disease and underwent stenting x 1. Brilinta continued. Lipitor dose increased for LDL increasing from 64 to 115. He returns today for hospital follow up. He denies recurrent symptoms since stenting. He was not aware to increase  statin.          Cardiac History:    Past Surgical History:   Procedure Laterality Date   • CARDIAC CATHETERIZATION  01/29/2018    95% (L) PDA- 2.5x22 Reolute. PTCA of LCX   • CARDIAC CATHETERIZATION  10/17/2018    Patent stent, 65% RCA with FFR .85. medcial mgmt   • CARDIAC CATHETERIZATION  07/08/2020    99% Distal RCA- 2.75x26 Resolute   • CARDIOVASCULAR STRESS TEST  12/12/2017    L. Myoview- R/O Inferior Ischemia   • CARDIOVASCULAR STRESS TEST  10/10/2018    L. Cardiolite- EF 52%. Inferior Infarct with Periinfarct Ischemia.   • CARDIOVASCULAR STRESS TEST  01/09/2020    L. Cardiolite- EF 45%. Inferoseptal Infarct with periinfarct Ischemia.   • ECHO - CONVERTED  12/12/2017    EF 65%   • ECHO - CONVERTED  07/08/2020    @ [facility]. EF 60%. Mild MR     Current Outpatient Medications   Medication Sig Dispense Refill   • ALPRAZolam (XANAX) 1 MG tablet Take 1 mg by mouth 3 (Three) Times a Day As Needed for Anxiety     • amLODIPine (NORVASC) 10 MG tablet Take 1 tablet by mouth Daily. 90 tablet 3   • aspirin 81 MG EC tablet Take 1 tablet by mouth Daily. 90 tablet 3   • carvedilol (COREG) 6.25 MG tablet Take 6.25 mg by mouth 2 (Two) Times a Day With Meals.     • fenofibrate micronized (LOFIBRA) 67 MG capsule Take 1 capsule by mouth Every Morning Before Breakfast. 90 capsule 3   • hydroxychloroquine (PLAQUENIL) 200 MG tablet Take 200 mg by mouth 2 (Two) Times a Day     • losartan-hydrochlorothiazide (HYZAAR) 50-12.5 MG per tablet Take 1 tablet by mouth Daily. 90 tablet 3   • mycophenolate (CELLCEPT) 200 MG/ML suspension Take 1,000 mg by mouth 2 (Two) Times a Day.     • OXcarbazepine (TRILEPTAL) 300 MG tablet Take 300 mg by mouth 3 (Three) Times a Day.     • ranolazine (Ranexa) 1000 MG 12 hr tablet Take 1 tablet by mouth Every 12 (Twelve) Hours. 180 tablet 3   • ticagrelor (Brilinta) 90 MG tablet tablet Take 90 mg by mouth 2 (Two) Times a Day.     • atorvastatin (LIPITOR) 40 MG tablet Take 1 tablet by mouth Daily. Please fill  until mail order arrives 30 tablet 0     No current facility-administered medications for this visit.      Imdur [isosorbide nitrate]; Keppra [levetiracetam]; and Lisinopril    Past Medical History:   Diagnosis Date   • Cerebral vasculitis    • History of surgery on arm     both arms   • Hyperlipidemia    • Hypertension    • Stroke (CMS/[facility]) 2014   • Systemic lupus (CMS/[facility])      Social History     Socioeconomic History   • Marital status:      Spouse name: Not on file   • Number of children: Not on file   • Years of education: Not on file   • Highest education level: Not on file   Tobacco Use   • Smoking status: Never Smoker   • Smokeless tobacco: Never Used   Substance and Sexual Activity   • Alcohol use: Yes     Comment: occasional   • Drug use: No     Family History   Problem Relation Age of Onset   • Hypertension Mother    • Hyperlipidemia Mother    • Lung disease Mother    • Heart attack Mother    • Heart disease Mother    • Peripheral vascular disease Mother    • Heart attack Father    • Heart disease Father    • Hypertension Father    • Hyperlipidemia Father    • Heart attack Maternal Uncle    • Heart disease Maternal Uncle    • Hypertension Maternal Uncle    • Hyperlipidemia Maternal Uncle    • Heart attack Paternal Uncle    • Heart disease Paternal Uncle    • Hypertension Paternal Uncle    • Hyperlipidemia Paternal Uncle    • Heart disease Maternal Grandmother    • Heart attack Maternal Grandmother    • Hypertension Maternal Grandmother    • Hyperlipidemia Maternal Grandmother    • Heart attack Maternal Grandfather    • Heart disease Maternal Grandfather    • Hypertension Maternal Grandfather    • Hyperlipidemia Maternal Grandfather    • Heart attack Paternal Grandmother    • Heart disease Paternal Grandmother    • Hypertension Paternal Grandmother    • Hyperlipidemia Paternal Grandmother    • Heart attack Paternal Grandfather    • Heart disease Paternal Grandfather    • Hypertension Paternal  "Grandfather    • Hyperlipidemia Paternal Grandfather      Review of Systems   Constitution: Positive for weight loss (down 8 lb). Negative for decreased appetite and malaise/fatigue.   HENT: Negative.    Eyes: Negative for blurred vision.   Cardiovascular: Negative for chest pain, dyspnea on exertion, leg swelling, palpitations and syncope.   Respiratory: Negative for shortness of breath and sleep disturbances due to breathing.    Endocrine: Negative.    Hematologic/Lymphatic: Negative for bleeding problem. Does not bruise/bleed easily.   Skin: Negative.    Musculoskeletal: Negative for falls and myalgias.   Gastrointestinal: Negative for abdominal pain, heartburn and melena.   Genitourinary: Negative for hematuria.   Neurological: Negative for dizziness and light-headedness.   Psychiatric/Behavioral: Negative for altered mental status.   Allergic/Immunologic: Negative.       Objective     /70 (BP Location: Right arm)   Pulse 60   Temp 97.5 °F (36.4 °C)   Ht 167.6 cm (65.98\")   Wt 85.4 kg (188 lb 3.2 oz)   BMI 30.39 kg/m²     Physical Exam   Constitutional: He is oriented to person, place, and time. He appears well-developed and well-nourished. No distress.   HENT:   Head: Normocephalic.   Eyes: Pupils are equal, round, and reactive to light.   Neck: Normal range of motion.   Cardiovascular: Normal rate, regular rhythm, S1 normal, S2 normal and intact distal pulses.   No murmur heard.  Pulmonary/Chest: Effort normal and breath sounds normal. No respiratory distress.   Abdominal: Soft. Bowel sounds are normal.   Musculoskeletal: Normal range of motion. He exhibits no edema.   Neurological: He is alert and oriented to person, place, and time.   Skin: Skin is warm and dry. He is not diaphoretic.   Psychiatric: He has a normal mood and affect.   Nursing note and vitals reviewed.     Procedures          Problem List Items Addressed This Visit        Cardiovascular and Mediastinum    Essential hypertension    " Relevant Medications    carvedilol (COREG) 6.25 MG tablet    Hypercholesteremia - Primary    Relevant Medications    atorvastatin (LIPITOR) 40 MG tablet    Other Relevant Orders    Lipid Panel    Comprehensive Metabolic Panel    Coronary artery disease of native artery of native heart with stable angina pectoris (CMS/HCC)    Relevant Medications    ticagrelor (Brilinta) 90 MG tablet tablet    carvedilol (COREG) 6.25 MG tablet      Other Visit Diagnoses     Long-term use of high-risk medication        Relevant Orders    Lipid Panel    Comprehensive Metabolic Panel    Elevated liver enzymes        Relevant Orders    Lipid Panel    Comprehensive Metabolic Panel         1. CAD- stable, without angina. Cath report reviewed with him in detail. No CP since stenting. Will continue Brilinta 90 mg BID, asp, increased statin dose.     2. HTN- well controlled with current medication. Continue the same. Limit Na. Weight loss.     3. Hyperlipidemia- appears LDL increased from 64 to 115 in a few short months. He admits to compliance with Lipitor 10 mg. He will be advised to increase to 40 mg. Recheck LFT and Lipid panel in 6-8 weeks as he has borderline elevated LFT. Avoid alcohol, Tylenol. Continue fenofibrate. If trigs remain elevated, need to add fish oil.     4. Elevated LFT- recheck in 6 weeks after increasing dose of statin. May need GI consult.     He was counseled extensively on CAD management and risk factor management. He will report any new or worsening symptoms. Keep follow up in December.     Patient's Body mass index is 30.39 kg/m². BMI is above normal parameters. Recommendations include: nutrition counseling.               Electronically signed by CHON Ansari,  August 15, 2020 10:08

## 2020-09-13 ENCOUNTER — RESULTS ENCOUNTER (OUTPATIENT)
Dept: CARDIOLOGY | Facility: CLINIC | Age: 50
End: 2020-09-13

## 2020-09-13 DIAGNOSIS — E78.00 HYPERCHOLESTEREMIA: ICD-10-CM

## 2020-09-13 DIAGNOSIS — R74.8 ELEVATED LIVER ENZYMES: ICD-10-CM

## 2020-09-13 DIAGNOSIS — Z79.899 LONG-TERM USE OF HIGH-RISK MEDICATION: ICD-10-CM

## 2020-11-17 ENCOUNTER — OFFICE VISIT (OUTPATIENT)
Dept: CARDIOLOGY | Facility: CLINIC | Age: 50
End: 2020-11-17

## 2020-11-17 VITALS
HEIGHT: 66 IN | WEIGHT: 187 LBS | BODY MASS INDEX: 30.05 KG/M2 | TEMPERATURE: 97.8 F | SYSTOLIC BLOOD PRESSURE: 140 MMHG | HEART RATE: 74 BPM | DIASTOLIC BLOOD PRESSURE: 76 MMHG

## 2020-11-17 DIAGNOSIS — I67.7 CEREBRAL VASCULITIS: ICD-10-CM

## 2020-11-17 DIAGNOSIS — R07.89 CHEST PRESSURE: ICD-10-CM

## 2020-11-17 DIAGNOSIS — I25.119 CORONARY ARTERY DISEASE INVOLVING NATIVE CORONARY ARTERY OF NATIVE HEART WITH ANGINA PECTORIS (HCC): Primary | ICD-10-CM

## 2020-11-17 DIAGNOSIS — I25.118 CORONARY ARTERY DISEASE OF NATIVE ARTERY OF NATIVE HEART WITH STABLE ANGINA PECTORIS (HCC): ICD-10-CM

## 2020-11-17 DIAGNOSIS — I10 ESSENTIAL HYPERTENSION: ICD-10-CM

## 2020-11-17 DIAGNOSIS — R06.02 SHORTNESS OF BREATH: ICD-10-CM

## 2020-11-17 DIAGNOSIS — E78.00 HYPERCHOLESTEREMIA: ICD-10-CM

## 2020-11-17 PROCEDURE — 99214 OFFICE O/P EST MOD 30 MIN: CPT | Performed by: NURSE PRACTITIONER

## 2020-11-17 RX ORDER — ASPIRIN 81 MG/1
81 TABLET ORAL DAILY
Qty: 90 TABLET | Refills: 3 | Status: SHIPPED | OUTPATIENT
Start: 2020-11-17 | End: 2021-05-17 | Stop reason: SDUPTHER

## 2020-11-17 NOTE — PROGRESS NOTES
Chief Complaint   Patient presents with   • Follow-up     Cardiac management   • Chest Pain     Has hospital stay with epilepitic episode , had chest pain , recieved 3 nitroglycerin  which resolved pain. Has  reported chest pain since , with pain in mid chest. Has c/o headache at top of head.   • Shortness of Breath     With exertion   • Labs     LAbs June 2020 on chart   • Med Refill     Needs refills on all cardiac and cholesterol meds 90 day supply  [facility]        Subjective       Myron Lackey is a 50 y.o. 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 He follows with  managed with [employer]. In January 2018, he had stenting of 95% L PDA, and PTCA of LCX. Later in 2018, stress test followed by cath for recurrent CP patent stent, and 65% stenosis of RCA with acceptable FFR.     Late 2019 he was admitted to  with seizure activity felt to be possibly related to alcohol withdrawal. He was treated with Keppra but developed psychosis and was stopped as he had no further seizure activity and EEG, MRI normal. He was seen by rheumatology who felt symptoms were not related to lupus exacerbation. His blood pressure and heart rate were elevated during this time as well as at follow up. Coreg added. Stress test repeated 1/9/20 showed EF 45%, inferoseptal infarct with ranjeet-infarct ischemia. Ranexa increased to 1000 mg BID. Cardiac cath recommended if patient had worsening chest pain.      Then in July 2020, he developed significant chest discomfort and dyspnea. Presented to ED with no EKG changes or elevated troponin. Since his chest pain responded to IV nitro, it was decided to proceed with cath. He was noted to have 99% distal RCA disease and underwent stenting x 1. Brilinta continued. Lipitor dose increased for LDL increasing from 64 to 115.  On 11/620 he was seen at [facility] for  episodes of staring spells.  It was unsure if they were actually seizures.  Apparently he experienced chest pain while there and cardiology was consulted.  The chest pain resolved with third dose nitro sublingual.  He was advised to follow-up with cardiology in regards.    Today he comes to the office with concerns over recent episode of chest pain.  Since home from hospital he continues to have mild chest discomfort and fatigue.  He experiences headaches.  No further seizure activity noted.  No recent change in cardiac medications noted.       Cardiac History:    Past Surgical History:   Procedure Laterality Date   • CARDIAC CATHETERIZATION  01/29/2018    95% (L) PDA- 2.5x22 Reolute. PTCA of LCX   • CARDIAC CATHETERIZATION  10/17/2018    Patent stent, 65% RCA with FFR .85. medcial mgmt   • CARDIAC CATHETERIZATION  07/08/2020    99% Distal RCA- 2.75x26 Resolute   • CARDIOVASCULAR STRESS TEST  12/12/2017    L. Myoview- R/O Inferior Ischemia   • CARDIOVASCULAR STRESS TEST  10/10/2018    L. Cardiolite- EF 52%. Inferior Infarct with Periinfarct Ischemia.   • CARDIOVASCULAR STRESS TEST  01/09/2020    L. Cardiolite- EF 45%. Inferoseptal Infarct with periinfarct Ischemia.   • ECHO - CONVERTED  12/12/2017    EF 65%   • ECHO - CONVERTED  07/08/2020    @ [facility]. EF 60%. Mild MR       Current Outpatient Medications   Medication Sig Dispense Refill   • ALPRAZolam (XANAX) 1 MG tablet Take 1 mg by mouth 3 (Three) Times a Day As Needed for Anxiety     • amLODIPine (NORVASC) 10 MG tablet Take 1 tablet by mouth Daily. 90 tablet 3   • aspirin 81 MG EC tablet Take 1 tablet by mouth Daily. 90 tablet 3   • atorvastatin (LIPITOR) 40 MG tablet Take 1 tablet by mouth Daily. Please fill until mail order arrives 30 tablet 0   • carvedilol (COREG) 6.25 MG tablet Take 1 tablet by mouth 2 (Two) Times a Day With Meals. 180 tablet 3   • fenofibrate micronized (LOFIBRA) 67 MG capsule Take 1 capsule by mouth Every Morning Before Breakfast. 90 capsule  3   • hydroxychloroquine (PLAQUENIL) 200 MG tablet Take 200 mg by mouth 2 (Two) Times a Day     • losartan-hydrochlorothiazide (HYZAAR) 50-12.5 MG per tablet Take 1 tablet by mouth Daily. 90 tablet 3   • mycophenolate (CELLCEPT) 200 MG/ML suspension Take 1,000 mg by mouth 2 (Two) Times a Day. 1000 mg in Am   1500 in PM     • OXcarbazepine (TRILEPTAL) 300 MG tablet Take 600 mg by mouth 2 (Two) Times a Day.     • ranolazine (Ranexa) 1000 MG 12 hr tablet Take 1 tablet by mouth Every 12 (Twelve) Hours. 180 tablet 3   • ticagrelor (Brilinta) 90 MG tablet tablet Take 1 tablet by mouth 2 (Two) Times a Day. 180 tablet 3   • nitroglycerin (NITROSTAT) 0.4 MG SL tablet 1 under the tongue as needed for angina, may repeat q5mins for up three doses 25 tablet 1     No current facility-administered medications for this visit.        Imdur [isosorbide nitrate], Keppra [levetiracetam], and Lisinopril    Past Medical History:   Diagnosis Date   • Cerebral vasculitis    • Epilepsy (CMS/HCC)    • History of surgery on arm     both arms   • Hyperlipidemia    • Hypertension    • Stroke (CMS/HCC) 2014   • Systemic lupus (CMS/HCC)        Social History     Socioeconomic History   • Marital status:      Spouse name: Not on file   • Number of children: Not on file   • Years of education: Not on file   • Highest education level: Not on file   Tobacco Use   • Smoking status: Never Smoker   • Smokeless tobacco: Never Used   Substance and Sexual Activity   • Alcohol use: Yes     Comment: occasional   • Drug use: No       Family History   Problem Relation Age of Onset   • Hypertension Mother    • Hyperlipidemia Mother    • Lung disease Mother    • Heart attack Mother    • Heart disease Mother    • Peripheral vascular disease Mother    • Heart attack Father    • Heart disease Father    • Hypertension Father    • Hyperlipidemia Father    • Heart attack Maternal Uncle    • Heart disease Maternal Uncle    • Hypertension Maternal Uncle    •  "Hyperlipidemia Maternal Uncle    • Heart attack Paternal Uncle    • Heart disease Paternal Uncle    • Hypertension Paternal Uncle    • Hyperlipidemia Paternal Uncle    • Heart disease Maternal Grandmother    • Heart attack Maternal Grandmother    • Hypertension Maternal Grandmother    • Hyperlipidemia Maternal Grandmother    • Heart attack Maternal Grandfather    • Heart disease Maternal Grandfather    • Hypertension Maternal Grandfather    • Hyperlipidemia Maternal Grandfather    • Heart attack Paternal Grandmother    • Heart disease Paternal Grandmother    • Hypertension Paternal Grandmother    • Hyperlipidemia Paternal Grandmother    • Heart attack Paternal Grandfather    • Heart disease Paternal Grandfather    • Hypertension Paternal Grandfather    • Hyperlipidemia Paternal Grandfather        Review of Systems   Constitution: Positive for malaise/fatigue. Negative for decreased appetite and diaphoresis.   HENT: Negative for nosebleeds.    Eyes: Positive for visual disturbance (r/t Lupus issues). Negative for blurred vision.   Cardiovascular: Positive for chest pain. Negative for claudication, cyanosis, dyspnea on exertion, irregular heartbeat, leg swelling, near-syncope, orthopnea, palpitations, paroxysmal nocturnal dyspnea and syncope.   Respiratory: Positive for shortness of breath.    Endocrine: Negative for cold intolerance and heat intolerance.   Hematologic/Lymphatic: Does not bruise/bleed easily.   Skin: Negative for rash.   Musculoskeletal: Negative for myalgias.   Gastrointestinal: Negative for heartburn, melena and nausea.   Genitourinary: Negative for dysuria and hematuria.   Neurological: Positive for headaches, light-headedness and seizures.   Psychiatric/Behavioral: The patient is nervous/anxious.    Allergic/Immunologic: Negative for persistent infections.        Objective     /76 (BP Location: Left arm)   Pulse 74   Temp 97.8 °F (36.6 °C)   Ht 167.6 cm (65.98\")   Wt 84.8 kg (187 lb)   " BMI 30.20 kg/m²     Vitals signs and nursing note reviewed.   Eyes:      Pupils: Pupils are equal, round, and reactive to light.   HENT:      Head: Normocephalic.   Neck:      Musculoskeletal: Normal range of motion.      Vascular: No carotid bruit.   Pulmonary:      Breath sounds: Normal breath sounds.   Cardiovascular:      Normal rate. Regular rhythm.      Murmurs: There is a grade 2/6 systolic murmur.   Edema:     Peripheral edema absent.   Abdominal:      General: Bowel sounds are normal.      Palpations: Abdomen is soft.   Musculoskeletal: Normal range of motion.   Skin:     General: Skin is warm.   Neurological:      Mental Status: Alert and oriented to person, place, and time.          Procedures: none today       Problem List Items Addressed This Visit        Cardiovascular and Mediastinum    Cerebral vasculitis    Essential hypertension    Relevant Medications    losartan-hydrochlorothiazide (HYZAAR) 50-12.5 MG per tablet    carvedilol (COREG) 6.25 MG tablet    amLODIPine (NORVASC) 10 MG tablet    Hypercholesteremia    Relevant Medications    atorvastatin (LIPITOR) 40 MG tablet    Coronary artery disease of native artery of native heart with stable angina pectoris (CMS/HCC)    Relevant Medications    ticagrelor (Brilinta) 90 MG tablet tablet    ranolazine (Ranexa) 1000 MG 12 hr tablet    carvedilol (COREG) 6.25 MG tablet    amLODIPine (NORVASC) 10 MG tablet    nitroglycerin (NITROSTAT) 0.4 MG SL tablet       Respiratory    Shortness of breath    Relevant Orders    Stress Test With Myocardial Perfusion One Day       Nervous and Auditory    Chest pressure    Relevant Medications    ranolazine (Ranexa) 1000 MG 12 hr tablet    Other Relevant Orders    Stress Test With Myocardial Perfusion One Day      Other Visit Diagnoses     Coronary artery disease involving native coronary artery of native heart with angina pectoris (CMS/HCC)    -  Primary    Relevant Medications    aspirin 81 MG EC tablet    ticagrelor  (Brilinta) 90 MG tablet tablet    ranolazine (Ranexa) 1000 MG 12 hr tablet    carvedilol (COREG) 6.25 MG tablet    amLODIPine (NORVASC) 10 MG tablet    nitroglycerin (NITROSTAT) 0.4 MG SL tablet    Other Relevant Orders    Stress Test With Myocardial Perfusion One Day         Patient presents today with recurrence of anginal symptoms.Cardiac catheterization in July showed significant disease of his distal RCA which was stented.  He had disease of proximal diagonal around 40% and 30% of proximal RCA.  A nuclear stress test ordered to relook at these areas for ischemia.  He admits to taking Brilinta and aspirin routinely.  He is on Ranexa at 1000 mg twice a day.  A prescription for Nitrostat sent to his pharmacy.  He continues Lipitor at 40 mg dose which was increased in July.     His blood pressure is stable.  His heart rate and rhythm are normal today.  No change in current cardiac medications made.  Refills faxed to his pharmacy.    Patient's Body mass index is 30.2 kg/m². BMI is above normal parameters. Recommendations include: nutrition counseling.  Earlier in the year he had a 10 pound weight loss which he has maintained.  I encouraged him on goal BMI around 25.    He will follow with you for lab orders.  Please forward copy of next lab results.  Please include lipid panel so we can evaluate the effectiveness of increased dose of Lipitor.     Further recommendations based on stress results.  A 6-month follow-up visit scheduled.  Please call sooner for cardiac concerns.           Electronically signed by CHON Newell,  November 18, 2020 07:59 EST

## 2020-11-18 RX ORDER — AMLODIPINE BESYLATE 10 MG/1
10 TABLET ORAL DAILY
Qty: 90 TABLET | Refills: 3 | Status: SHIPPED | OUTPATIENT
Start: 2020-11-18 | End: 2021-05-17 | Stop reason: SDUPTHER

## 2020-11-18 RX ORDER — ATORVASTATIN CALCIUM 40 MG/1
40 TABLET, FILM COATED ORAL DAILY
Qty: 30 TABLET | Refills: 0 | Status: SHIPPED | OUTPATIENT
Start: 2020-11-18 | End: 2021-04-22

## 2020-11-18 RX ORDER — RANOLAZINE 1000 MG/1
1000 TABLET, EXTENDED RELEASE ORAL EVERY 12 HOURS SCHEDULED
Qty: 180 TABLET | Refills: 3 | Status: SHIPPED | OUTPATIENT
Start: 2020-11-18 | End: 2021-05-17 | Stop reason: SDUPTHER

## 2020-11-18 RX ORDER — NITROGLYCERIN 0.4 MG/1
TABLET SUBLINGUAL
Qty: 25 TABLET | Refills: 1 | Status: SHIPPED | OUTPATIENT
Start: 2020-11-18 | End: 2023-03-02

## 2020-11-18 RX ORDER — LOSARTAN POTASSIUM AND HYDROCHLOROTHIAZIDE 12.5; 5 MG/1; MG/1
1 TABLET ORAL DAILY
Qty: 90 TABLET | Refills: 3 | Status: SHIPPED | OUTPATIENT
Start: 2020-11-18 | End: 2021-05-17 | Stop reason: DRUGHIGH

## 2020-11-18 RX ORDER — CARVEDILOL 6.25 MG/1
6.25 TABLET ORAL 2 TIMES DAILY WITH MEALS
Qty: 180 TABLET | Refills: 3 | Status: SHIPPED | OUTPATIENT
Start: 2020-11-18 | End: 2021-05-17 | Stop reason: SDUPTHER

## 2020-11-19 ENCOUNTER — HOSPITAL ENCOUNTER (OUTPATIENT)
Dept: CARDIOLOGY | Facility: HOSPITAL | Age: 50
Discharge: HOME | End: 2020-11-19

## 2020-11-19 ENCOUNTER — LAB (OUTPATIENT)
Dept: LAB | Facility: HOSPITAL | Age: 50
End: 2020-11-19

## 2020-11-19 DIAGNOSIS — I25.119 CORONARY ARTERY DISEASE INVOLVING NATIVE CORONARY ARTERY OF NATIVE HEART WITH ANGINA PECTORIS (HCC): ICD-10-CM

## 2020-11-19 DIAGNOSIS — R07.89 CHEST PRESSURE: ICD-10-CM

## 2020-11-19 DIAGNOSIS — R06.02 SHORTNESS OF BREATH: ICD-10-CM

## 2020-11-19 LAB
ALBUMIN SERPL-MCNC: 5.01 G/DL (ref 3.5–5.2)
ALBUMIN/GLOB SERPL: 2.4 G/DL
ALP SERPL-CCNC: 71 U/L (ref 39–117)
ALT SERPL W P-5'-P-CCNC: 22 U/L (ref 1–41)
ANION GAP SERPL CALCULATED.3IONS-SCNC: 12.9 MMOL/L (ref 5–15)
AST SERPL-CCNC: 19 U/L (ref 1–40)
BILIRUB SERPL-MCNC: 0.7 MG/DL (ref 0–1.2)
BUN SERPL-MCNC: 12 MG/DL (ref 6–20)
BUN/CREAT SERPL: 15.4 (ref 7–25)
CALCIUM SPEC-SCNC: 9.3 MG/DL (ref 8.6–10.5)
CHLORIDE SERPL-SCNC: 105 MMOL/L (ref 98–107)
CHOLEST SERPL-MCNC: 136 MG/DL (ref 0–200)
CO2 SERPL-SCNC: 20.1 MMOL/L (ref 22–29)
CREAT SERPL-MCNC: 0.78 MG/DL (ref 0.76–1.27)
GFR SERPL CREATININE-BSD FRML MDRD: 105 ML/MIN/1.73
GLOBULIN UR ELPH-MCNC: 2.1 GM/DL
GLUCOSE SERPL-MCNC: 105 MG/DL (ref 65–99)
HDLC SERPL-MCNC: 46 MG/DL (ref 40–60)
LDLC SERPL CALC-MCNC: 68 MG/DL (ref 0–100)
LDLC/HDLC SERPL: 1.43 {RATIO}
POTASSIUM SERPL-SCNC: 4.1 MMOL/L (ref 3.5–5.2)
PROT SERPL-MCNC: 7.1 G/DL (ref 6–8.5)
SODIUM SERPL-SCNC: 138 MMOL/L (ref 136–145)
TRIGL SERPL-MCNC: 120 MG/DL (ref 0–150)
VLDLC SERPL-MCNC: 22 MG/DL (ref 5–40)

## 2020-11-19 PROCEDURE — 0 TECHNETIUM SESTAMIBI: Performed by: INTERNAL MEDICINE

## 2020-11-19 PROCEDURE — 93018 CV STRESS TEST I&R ONLY: CPT | Performed by: INTERNAL MEDICINE

## 2020-11-19 PROCEDURE — 25010000002 REGADENOSON 0.4 MG/5ML SOLUTION: Performed by: INTERNAL MEDICINE

## 2020-11-19 PROCEDURE — 78452 HT MUSCLE IMAGE SPECT MULT: CPT

## 2020-11-19 PROCEDURE — A9500 TC99M SESTAMIBI: HCPCS | Performed by: INTERNAL MEDICINE

## 2020-11-19 PROCEDURE — 78452 HT MUSCLE IMAGE SPECT MULT: CPT | Performed by: INTERNAL MEDICINE

## 2020-11-19 PROCEDURE — 80061 LIPID PANEL: CPT | Performed by: NURSE PRACTITIONER

## 2020-11-19 PROCEDURE — 93017 CV STRESS TEST TRACING ONLY: CPT

## 2020-11-19 PROCEDURE — 93016 CV STRESS TEST SUPVJ ONLY: CPT | Performed by: NURSE PRACTITIONER

## 2020-11-19 PROCEDURE — 80053 COMPREHEN METABOLIC PANEL: CPT | Performed by: NURSE PRACTITIONER

## 2020-11-19 PROCEDURE — 36415 COLL VENOUS BLD VENIPUNCTURE: CPT | Performed by: NURSE PRACTITIONER

## 2020-11-19 RX ADMIN — TECHNETIUM TC 99M SESTAMIBI 1 DOSE: 1 INJECTION INTRAVENOUS at 11:15

## 2020-11-19 RX ADMIN — TECHNETIUM TC 99M SESTAMIBI 1 DOSE: 1 INJECTION INTRAVENOUS at 09:09

## 2020-11-19 RX ADMIN — REGADENOSON 0.4 MG: 0.08 INJECTION, SOLUTION INTRAVENOUS at 11:15

## 2020-11-20 LAB
BH CV NUCLEAR PRIOR STUDY: 3
BH CV STRESS COMMENTS STAGE 1: NORMAL
BH CV STRESS DOSE REGADENOSON STAGE 1: 0.4
BH CV STRESS DURATION MIN STAGE 1: 0
BH CV STRESS DURATION SEC STAGE 1: 10
BH CV STRESS PROTOCOL 1: NORMAL
BH CV STRESS RECOVERY BP: NORMAL MMHG
BH CV STRESS RECOVERY HR: 94 BPM
BH CV STRESS STAGE 1: 1
LV EF NUC BP: 58 %
MAXIMAL PREDICTED HEART RATE: 170 BPM
PERCENT MAX PREDICTED HR: 57.06 %
STRESS BASELINE BP: NORMAL MMHG
STRESS BASELINE HR: 73 BPM
STRESS PERCENT HR: 67 %
STRESS POST PEAK BP: NORMAL MMHG
STRESS POST PEAK HR: 97 BPM
STRESS TARGET HR: 145 BPM

## 2020-11-20 RX ORDER — NITROGLYCERIN 80 MG/1
1 PATCH TRANSDERMAL SEE ADMIN INSTRUCTIONS
Qty: 30 PATCH | Refills: 11 | Status: SHIPPED | OUTPATIENT
Start: 2020-11-20 | End: 2022-01-12 | Stop reason: SDUPTHER

## 2020-12-08 ENCOUNTER — TELEPHONE (OUTPATIENT)
Dept: CARDIOLOGY | Facility: CLINIC | Age: 50
End: 2020-12-08

## 2020-12-08 NOTE — TELEPHONE ENCOUNTER
No answer, VM is full and not accepting messages.  I am calling patient to let him know his disability letter and records are ready for pickup.

## 2021-01-27 DIAGNOSIS — E78.1 HYPERTRIGLYCERIDEMIA: ICD-10-CM

## 2021-01-29 RX ORDER — FENOFIBRATE 67 MG/1
CAPSULE ORAL
Qty: 90 CAPSULE | Refills: 3 | Status: SHIPPED | OUTPATIENT
Start: 2021-01-29 | End: 2021-05-17 | Stop reason: SDUPTHER

## 2021-04-22 RX ORDER — ATORVASTATIN CALCIUM 40 MG/1
TABLET, FILM COATED ORAL
Qty: 90 TABLET | Refills: 2 | Status: SHIPPED | OUTPATIENT
Start: 2021-04-22 | End: 2021-05-17 | Stop reason: SDUPTHER

## 2021-05-17 ENCOUNTER — OFFICE VISIT (OUTPATIENT)
Dept: CARDIOLOGY | Facility: CLINIC | Age: 51
End: 2021-05-17

## 2021-05-17 ENCOUNTER — TELEPHONE (OUTPATIENT)
Dept: CARDIOLOGY | Facility: CLINIC | Age: 51
End: 2021-05-17

## 2021-05-17 VITALS
TEMPERATURE: 98.4 F | WEIGHT: 185 LBS | HEIGHT: 66 IN | SYSTOLIC BLOOD PRESSURE: 146 MMHG | HEART RATE: 76 BPM | BODY MASS INDEX: 29.73 KG/M2 | DIASTOLIC BLOOD PRESSURE: 98 MMHG

## 2021-05-17 DIAGNOSIS — R79.89 LOW VITAMIN D LEVEL: ICD-10-CM

## 2021-05-17 DIAGNOSIS — R42 DIZZINESS: ICD-10-CM

## 2021-05-17 DIAGNOSIS — I25.118 CORONARY ARTERY DISEASE OF NATIVE ARTERY OF NATIVE HEART WITH STABLE ANGINA PECTORIS (HCC): ICD-10-CM

## 2021-05-17 DIAGNOSIS — R07.89 CHEST PRESSURE: Primary | ICD-10-CM

## 2021-05-17 DIAGNOSIS — I10 ESSENTIAL HYPERTENSION: ICD-10-CM

## 2021-05-17 DIAGNOSIS — E55.9 VITAMIN D DEFICIENCY: ICD-10-CM

## 2021-05-17 DIAGNOSIS — R06.02 SHORTNESS OF BREATH: ICD-10-CM

## 2021-05-17 DIAGNOSIS — E78.00 HYPERCHOLESTEREMIA: ICD-10-CM

## 2021-05-17 DIAGNOSIS — I25.119 CORONARY ARTERY DISEASE INVOLVING NATIVE CORONARY ARTERY OF NATIVE HEART WITH ANGINA PECTORIS (HCC): ICD-10-CM

## 2021-05-17 DIAGNOSIS — R73.9 HYPERGLYCEMIA: ICD-10-CM

## 2021-05-17 DIAGNOSIS — E78.1 HYPERTRIGLYCERIDEMIA: ICD-10-CM

## 2021-05-17 DIAGNOSIS — R51.9 NONINTRACTABLE HEADACHE, UNSPECIFIED CHRONICITY PATTERN, UNSPECIFIED HEADACHE TYPE: ICD-10-CM

## 2021-05-17 PROCEDURE — 99214 OFFICE O/P EST MOD 30 MIN: CPT | Performed by: NURSE PRACTITIONER

## 2021-05-17 RX ORDER — LOSARTAN POTASSIUM AND HYDROCHLOROTHIAZIDE 25; 100 MG/1; MG/1
1 TABLET ORAL DAILY
Qty: 90 TABLET | Refills: 3 | Status: SHIPPED | OUTPATIENT
Start: 2021-05-17 | End: 2022-01-12 | Stop reason: SDUPTHER

## 2021-05-17 RX ORDER — AMLODIPINE BESYLATE 10 MG/1
10 TABLET ORAL DAILY
Qty: 90 TABLET | Refills: 3 | Status: SHIPPED | OUTPATIENT
Start: 2021-05-17 | End: 2021-10-11

## 2021-05-17 RX ORDER — RANOLAZINE 1000 MG/1
1000 TABLET, EXTENDED RELEASE ORAL EVERY 12 HOURS SCHEDULED
Qty: 180 TABLET | Refills: 3 | Status: SHIPPED | OUTPATIENT
Start: 2021-05-17 | End: 2022-01-12

## 2021-05-17 RX ORDER — FENOFIBRATE 67 MG/1
67 CAPSULE ORAL
Qty: 90 CAPSULE | Refills: 3 | Status: SHIPPED | OUTPATIENT
Start: 2021-05-17 | End: 2022-01-12 | Stop reason: SDUPTHER

## 2021-05-17 RX ORDER — CARVEDILOL 6.25 MG/1
6.25 TABLET ORAL 2 TIMES DAILY WITH MEALS
Qty: 180 TABLET | Refills: 3 | Status: SHIPPED | OUTPATIENT
Start: 2021-05-17 | End: 2021-08-19 | Stop reason: DRUGHIGH

## 2021-05-17 RX ORDER — ASPIRIN 81 MG/1
81 TABLET ORAL DAILY
Qty: 90 TABLET | Refills: 3 | Status: SHIPPED | OUTPATIENT
Start: 2021-05-17 | End: 2022-08-24

## 2021-05-17 RX ORDER — ATORVASTATIN CALCIUM 40 MG/1
40 TABLET, FILM COATED ORAL DAILY
Qty: 90 TABLET | Refills: 3 | Status: SHIPPED | OUTPATIENT
Start: 2021-05-17 | End: 2021-05-21 | Stop reason: DRUGHIGH

## 2021-05-17 NOTE — TELEPHONE ENCOUNTER
April,     Can we schedule him for a cath? He had stenting 7/2020, positive stress in November 2020. Having more chest pain. Would like to proceed with cath.     I ordered labs and carotid US this week at [facility].    Thank you

## 2021-05-17 NOTE — PROGRESS NOTES
Chief Complaint   Patient presents with   • Follow-up     for cardiac management   • Med Refill     refills needed on cardiac meds, 90 days to [facility].   • Shortness of Breath     with exertion, PCP recently ordered PFT, pt brought results with him. Seeing pulmonologist at .    • Labs     most recent lipids in chart from November, pt brought copy of recent CBC   • Hypertension     consistently elevated, 146/98 today, reports about what it has been running.        Subjective       Myron Lackey is a 50 y.o. 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 He follows with  managed with [employer]. In January 2018, he had stenting of 95% L PDA, and PTCA of LCX. Later in 2018, found to have patent stent with 65% RCA, acceptable FFR.       Late 2019 he was admitted to  with seizure activity felt to be possibly related to alcohol withdrawal. He was treated with Keppra but developed psychosis and was stopped as he had no further seizure activity and EEG, MRI normal. He was seen by rheumatology who felt symptoms were not related to lupus exacerbation. His blood pressure and heart rate were elevated during this time as well as at follow up. Coreg added. Stress test repeated 1/9/20 showed EF 45%, inferoseptal infarct with ranjeet-infarct ischemia. Ranexa increased to 1000 mg BID. Cardiac cath recommended if patient had worsening chest pain.      Then in July 2020, he developed significant chest discomfort and dyspnea. Presented to ED with no EKG changes or elevated troponin. Since his chest pain responded to IV nitro, cardiac cath was done revealing 99% distal RCA which was stented.  Brilinta continued. Lipitor dose increased for LDL increasing from 64 to 115.  On 11/6/20 he was seen at [facility] for episodes of staring spells.  It was unsure if they were actually seizures.  Apparently he experienced chest  pain while there and cardiology was consulted.  The chest pain resolved with third dose nitro sublingual.  He was advised to follow-up with cardiology in regards.  Stress test repeated on 11/20/2020 revealing small inferoseptal wall infarct with ranjeet-infarct ischemia similar to previous stress test (prior to RCA stent).  Ranexa has been maximized and he did not tolerate Imdur secondary to headache, therefore nitroglycerin patch prescribed with plan for cath if symptoms persist.    He returns today for follow-up.  He continues to have regular episodes of chest pain, associated with exertion as well as when he gets anxious.  Chest pain is substernal radiating to both arms associated with shortness of breath and diaphoresis.  Pain lasts for a few minutes, subsides with rest or application of nitro patch.  He unfortunately did not tolerate Nitropatch daily and uses as needed.  Blood pressure has been consistently elevated for the last few weeks.  He has pain in the left side of his neck with associated headache and dizziness.         Cardiac History:    Past Surgical History:   Procedure Laterality Date   • CARDIAC CATHETERIZATION  01/29/2018    95% (L) PDA- 2.5x22 Reolute. PTCA of LCX   • CARDIAC CATHETERIZATION  10/17/2018    Patent stent, 65% RCA with FFR .85. [employer] mgmt   • CARDIAC CATHETERIZATION  07/08/2020    99% Distal RCA- 2.75x26 Resolute   • CARDIOVASCULAR STRESS TEST  12/12/2017    L. Myoview- R/O Inferior Ischemia   • CARDIOVASCULAR STRESS TEST  10/10/2018    L. Cardiolite- EF 52%. Inferior Infarct with Periinfarct Ischemia.   • CARDIOVASCULAR STRESS TEST  01/09/2020    L. Cardiolite- EF 45%. Inferoseptal Infarct with periinfarct Ischemia.   • CARDIOVASCULAR STRESS TEST  11/19/2020    Lexiscan- EF 58%. Inferoseptal Infarct with PeriInfarct Ischemia.   • ECHO - CONVERTED  12/12/2017    EF 65%   • ECHO - CONVERTED  07/08/2020    @ [facility]. EF 60%. Mild MR     Current Outpatient Medications   Medication Sig  Dispense Refill   • ALPRAZolam (XANAX) 1 MG tablet Take 1 mg by mouth 3 (Three) Times a Day As Needed for Anxiety     • amLODIPine (NORVASC) 10 MG tablet Take 1 tablet by mouth Daily. 90 tablet 3   • aspirin 81 MG EC tablet Take 1 tablet by mouth Daily. 90 tablet 3   • atorvastatin (LIPITOR) 40 MG tablet Take 1 tablet by mouth Daily. for cholesterol 90 tablet 3   • carvedilol (COREG) 6.25 MG tablet Take 1 tablet by mouth 2 (Two) Times a Day With Meals. 180 tablet 3   • fenofibrate micronized (LOFIBRA) 67 MG capsule Take 1 capsule by mouth Every Morning Before Breakfast. 90 capsule 3   • hydroxychloroquine (PLAQUENIL) 200 MG tablet Take 200 mg by mouth 2 (Two) Times a Day     • mycophenolate (CELLCEPT) 200 MG/ML suspension Take 1,000 mg by mouth. 1000 mg in Am   1500 in PM     • nitroglycerin (NITRODUR) 0.4 MG/HR patch Place 1 patch on the skin as directed by provider See Admin Instructions. Apply patch daily, remove at night for at least 10 hours (Patient taking differently: Place 1 patch on the skin as directed by provider See Admin Instructions. Apply patch daily, remove at night for at least 10 hours only uses prn) 30 patch 11   • nitroglycerin (NITROSTAT) 0.4 MG SL tablet 1 under the tongue as needed for angina, may repeat q5mins for up three doses 25 tablet 1   • OXcarbazepine (TRILEPTAL) 300 MG tablet Take 600 mg by mouth 2 (Two) Times a Day.     • ranolazine (Ranexa) 1000 MG 12 hr tablet Take 1 tablet by mouth Every 12 (Twelve) Hours. 180 tablet 3   • ticagrelor (Brilinta) 90 MG tablet tablet Take 1 tablet by mouth 2 (Two) Times a Day. 180 tablet 3   • losartan-hydrochlorothiazide (Hyzaar) 100-25 MG per tablet Take 1 tablet by mouth Daily. 90 tablet 3     No current facility-administered medications for this visit.     Imdur [isosorbide nitrate], Keppra [levetiracetam], and Lisinopril    Past Medical History:   Diagnosis Date   • Cerebral vasculitis    • Epilepsy (CMS/HCC)    • History of surgery on arm      both arms   • Hyperlipidemia    • Hypertension    • Stroke (CMS/HCC) 2014   • Systemic lupus (CMS/[facility])      Social History     Socioeconomic History   • Marital status:      Spouse name: Not on file   • Number of children: Not on file   • Years of education: Not on file   • Highest education level: Not on file   Tobacco Use   • Smoking status: Never Smoker   • Smokeless tobacco: Never Used   Substance and Sexual Activity   • Alcohol use: Yes     Comment: occasional   • Drug use: No     Family History   Problem Relation Age of Onset   • Hypertension Mother    • Hyperlipidemia Mother    • Lung disease Mother    • Heart attack Mother    • Heart disease Mother    • Peripheral vascular disease Mother    • Heart attack Father    • Heart disease Father    • Hypertension Father    • Hyperlipidemia Father    • Heart attack Maternal Uncle    • Heart disease Maternal Uncle    • Hypertension Maternal Uncle    • Hyperlipidemia Maternal Uncle    • Heart attack Paternal Uncle    • Heart disease Paternal Uncle    • Hypertension Paternal Uncle    • Hyperlipidemia Paternal Uncle    • Heart disease Maternal Grandmother    • Heart attack Maternal Grandmother    • Hypertension Maternal Grandmother    • Hyperlipidemia Maternal Grandmother    • Heart attack Maternal Grandfather    • Heart disease Maternal Grandfather    • Hypertension Maternal Grandfather    • Hyperlipidemia Maternal Grandfather    • Heart attack Paternal Grandmother    • Heart disease Paternal Grandmother    • Hypertension Paternal Grandmother    • Hyperlipidemia Paternal Grandmother    • Heart attack Paternal Grandfather    • Heart disease Paternal Grandfather    • Hypertension Paternal Grandfather    • Hyperlipidemia Paternal Grandfather      Review of Systems   Constitutional: Positive for diaphoresis, malaise/fatigue and weight loss (Down 2 pounds). Negative for decreased appetite.   HENT: Negative.    Eyes: Negative for blurred vision.   Cardiovascular:  "Positive for chest pain and dyspnea on exertion. Negative for leg swelling, palpitations and syncope.   Respiratory: Positive for shortness of breath. Negative for sleep disturbances due to breathing.    Endocrine: Negative.    Hematologic/Lymphatic: Negative for bleeding problem. Does not bruise/bleed easily.   Skin: Negative.    Musculoskeletal: Negative for falls and myalgias.   Gastrointestinal: Negative for abdominal pain, heartburn and melena.   Genitourinary: Negative for hematuria.   Neurological: Positive for dizziness. Negative for light-headedness.   Psychiatric/Behavioral: Negative for altered mental status.   Allergic/Immunologic: Negative.       Objective     /98   Pulse 76   Temp 98.4 °F (36.9 °C)   Ht 167.6 cm (66\")   Wt 83.9 kg (185 lb)   BMI 29.86 kg/m²     Vitals and nursing note reviewed.   Constitutional:       General: Not in acute distress.     Appearance: Well-developed. Not diaphoretic.   Eyes:      Pupils: Pupils are equal, round, and reactive to light.   HENT:      Head: Normocephalic.   Pulmonary:      Effort: Pulmonary effort is normal. No respiratory distress.      Breath sounds: Normal breath sounds.   Cardiovascular:      Normal rate. Regular rhythm.   Pulses:     Intact distal pulses.   Edema:     Peripheral edema absent.   Abdominal:      General: Bowel sounds are normal.      Palpations: Abdomen is soft.   Musculoskeletal: Normal range of motion.      Cervical back: Normal range of motion. Skin:     General: Skin is warm and dry.   Neurological:      Mental Status: Alert and oriented to person, place, and time.        Procedures          Problem List Items Addressed This Visit        Cardiac and Vasculature    Essential hypertension    Relevant Medications    losartan-hydrochlorothiazide (Hyzaar) 100-25 MG per tablet    carvedilol (COREG) 6.25 MG tablet    amLODIPine (NORVASC) 10 MG tablet    Other Relevant Orders    [facility]    CBC & Differential    TSH    " US Carotid Bilateral    Hypercholesteremia    Relevant Medications    fenofibrate micronized (LOFIBRA) 67 MG capsule    atorvastatin (LIPITOR) 40 MG tablet    Other Relevant Orders    [facility]    High Sensitivity CRP    Lipid Panel    Coronary artery disease of native artery of native heart with stable angina pectoris (CMS/HCC)    Relevant Medications    carvedilol (COREG) 6.25 MG tablet    ranolazine (Ranexa) 1000 MG 12 hr tablet    amLODIPine (NORVASC) 10 MG tablet    ticagrelor (Brilinta) 90 MG tablet tablet       Pulmonary and Pneumonias    Shortness of breath    Relevant Orders    [facility]    CBC & Differential       Symptoms and Signs    Chest pressure - Primary    Relevant Medications    ranolazine (Ranexa) 1000 MG 12 hr tablet    Other Relevant Orders    [facility]    CBC & Differential    Hemoglobin A1c    High Sensitivity CRP    TSH    US Carotid Bilateral      Other Visit Diagnoses     Coronary artery disease involving native coronary artery of native heart with angina pectoris (CMS/HCC)        Relevant Medications    carvedilol (COREG) 6.25 MG tablet    ranolazine (Ranexa) 1000 MG 12 hr tablet    amLODIPine (NORVASC) 10 MG tablet    ticagrelor (Brilinta) 90 MG tablet tablet    aspirin 81 MG EC tablet    Other Relevant Orders    [facility]    High Sensitivity CRP    TSH    US Carotid Bilateral    Low vitamin D level        Relevant Orders    Vitamin D 25 Hydroxy    Hyperglycemia        Relevant Orders    Hemoglobin A1c    Vitamin D deficiency        Relevant Orders    Vitamin D 25 Hydroxy    Hypertriglyceridemia        Relevant Medications    fenofibrate micronized (LOFIBRA) 67 MG capsule    atorvastatin (LIPITOR) 40 MG tablet    Nonintractable headache, unspecified chronicity pattern, unspecified headache type        Relevant Orders    US Carotid Bilateral    Dizziness        Relevant Orders    US Carotid Bilateral         1.  CAD-s/p stenting of the PDA  1/2018;  worsening RCA disease with 99% stenosis stented on 7/8/2020; stress test for recurrent chest pain November 2020 showed inferoseptal infarct with ranjeet-infarct ischemia.  Medical management was maximized but continues to have chest pain.  He would like to proceed with elective cardiac cath for definitive diagnosis.  Will discuss with Dr. Gallardo and schedule. Continue Brilinta, asp, statin, bb, Ranexa, antihypertensives.     2. HTN- elevated today at 146/98. Increase losartan to 100/25 x one week. Monitor bp. If not improved, will change amlodipine to nifedipine. Limit Na.     3. Hyperlipidemia- LDL well controlled at 68 with Lipitor 40 mg. Recheck Lipids and LFT. Heart healthy diet.     4. Dizziness/headache/neck pain- will check carotid US to rule out carotid or vertebral stenosis.     Further recommendations to follow labs, carotid US, cardiac cath.     Patient's Body mass index is 29.86 kg/m². indicating that he is overweight (BMI 25-29.9). Obesity-related health conditions include the following: hypertension, coronary heart disease, diabetes mellitus and dyslipidemias. Obesity is improving with lifestyle modifications. BMI is is above average; BMI management plan is completed. We discussed portion control and increasing exercise..               Electronically signed by CHON Ansari,  May 17, 2021 16:07 EDT

## 2021-05-20 ENCOUNTER — LAB (OUTPATIENT)
Dept: LAB | Facility: HOSPITAL | Age: 51
End: 2021-05-20

## 2021-05-20 ENCOUNTER — HOSPITAL ENCOUNTER (OUTPATIENT)
Dept: CARDIOLOGY | Facility: HOSPITAL | Age: 51
Discharge: HOME | End: 2021-05-20

## 2021-05-20 DIAGNOSIS — I25.119 CORONARY ARTERY DISEASE INVOLVING NATIVE CORONARY ARTERY OF NATIVE HEART WITH ANGINA PECTORIS (HCC): ICD-10-CM

## 2021-05-20 DIAGNOSIS — R73.9 HYPERGLYCEMIA: ICD-10-CM

## 2021-05-20 DIAGNOSIS — R51.9 NONINTRACTABLE HEADACHE, UNSPECIFIED CHRONICITY PATTERN, UNSPECIFIED HEADACHE TYPE: ICD-10-CM

## 2021-05-20 DIAGNOSIS — R07.89 CHEST PRESSURE: ICD-10-CM

## 2021-05-20 DIAGNOSIS — R79.89 LOW VITAMIN D LEVEL: ICD-10-CM

## 2021-05-20 DIAGNOSIS — I10 ESSENTIAL HYPERTENSION: ICD-10-CM

## 2021-05-20 DIAGNOSIS — E55.9 VITAMIN D DEFICIENCY: ICD-10-CM

## 2021-05-20 DIAGNOSIS — R42 DIZZINESS: ICD-10-CM

## 2021-05-20 DIAGNOSIS — E78.00 HYPERCHOLESTEREMIA: ICD-10-CM

## 2021-05-20 DIAGNOSIS — R06.02 SHORTNESS OF BREATH: ICD-10-CM

## 2021-05-20 LAB
BASOPHILS # BLD AUTO: 0.04 10*3/MM3 (ref 0–0.2)
BASOPHILS NFR BLD AUTO: 0.6 % (ref 0–1.5)
CHOLEST SERPL-MCNC: 180 MG/DL (ref 0–200)
DEPRECATED RDW RBC AUTO: 44.5 FL (ref 37–54)
EOSINOPHIL # BLD AUTO: 0.13 10*3/MM3 (ref 0–0.4)
EOSINOPHIL NFR BLD AUTO: 1.8 % (ref 0.3–6.2)
ERYTHROCYTE [DISTWIDTH] IN BLOOD BY AUTOMATED COUNT: 13.2 % (ref 12.3–15.4)
HBA1C MFR BLD: 5.1 % (ref 4.8–5.6)
HCT VFR BLD AUTO: 48.6 % (ref 37.5–51)
HDLC SERPL-MCNC: 45 MG/DL (ref 40–60)
HGB BLD-MCNC: 16.2 G/DL (ref 13–17.7)
IMM GRANULOCYTES # BLD AUTO: 0.01 10*3/MM3 (ref 0–0.05)
IMM GRANULOCYTES NFR BLD AUTO: 0.1 % (ref 0–0.5)
LDLC SERPL CALC-MCNC: 108 MG/DL (ref 0–100)
LDLC/HDLC SERPL: 2.32 {RATIO}
LYMPHOCYTES # BLD AUTO: 1.46 10*3/MM3 (ref 0.7–3.1)
LYMPHOCYTES NFR BLD AUTO: 20.7 % (ref 19.6–45.3)
MCH RBC QN AUTO: 30.3 PG (ref 26.6–33)
MCHC RBC AUTO-ENTMCNC: 33.3 G/DL (ref 31.5–35.7)
MCV RBC AUTO: 90.8 FL (ref 79–97)
MONOCYTES # BLD AUTO: 0.63 10*3/MM3 (ref 0.1–0.9)
MONOCYTES NFR BLD AUTO: 8.9 % (ref 5–12)
NEUTROPHILS NFR BLD AUTO: 4.79 10*3/MM3 (ref 1.7–7)
NEUTROPHILS NFR BLD AUTO: 67.9 % (ref 42.7–76)
NRBC BLD AUTO-RTO: 0 /100 WBC (ref 0–0.2)
PLATELET # BLD AUTO: 302 10*3/MM3 (ref 140–450)
PMV BLD AUTO: 9.2 FL (ref 6–12)
RBC # BLD AUTO: 5.35 10*6/MM3 (ref 4.14–5.8)
TRIGL SERPL-MCNC: 152 MG/DL (ref 0–150)
TSH SERPL DL<=0.05 MIU/L-ACNC: 2.27 UIU/ML (ref 0.27–4.2)
VLDLC SERPL-MCNC: 27 MG/DL (ref 5–40)
WBC # BLD AUTO: 7.06 10*3/MM3 (ref 3.4–10.8)

## 2021-05-20 PROCEDURE — 93880 EXTRACRANIAL BILAT STUDY: CPT | Performed by: RADIOLOGY

## 2021-05-20 PROCEDURE — 84443 ASSAY THYROID STIM HORMONE: CPT

## 2021-05-20 PROCEDURE — 93880 EXTRACRANIAL BILAT STUDY: CPT

## 2021-05-20 PROCEDURE — 86141 C-REACTIVE PROTEIN HS: CPT

## 2021-05-20 PROCEDURE — 85025 COMPLETE CBC W/AUTO DIFF WBC: CPT

## 2021-05-20 PROCEDURE — 80061 LIPID PANEL: CPT

## 2021-05-20 PROCEDURE — 36415 COLL VENOUS BLD VENIPUNCTURE: CPT

## 2021-05-20 PROCEDURE — 83036 HEMOGLOBIN GLYCOSYLATED A1C: CPT

## 2021-05-20 PROCEDURE — 82306 VITAMIN D 25 HYDROXY: CPT

## 2021-05-21 LAB
25(OH)D3+25(OH)D2 SERPL-MCNC: 19.9 NG/ML (ref 30–100)
CRP SERPL HS-MCNC: 1.05 MG/L (ref 0–3)

## 2021-05-21 RX ORDER — ATORVASTATIN CALCIUM 80 MG/1
80 TABLET, FILM COATED ORAL DAILY
Qty: 90 TABLET | Refills: 3 | Status: SHIPPED | OUTPATIENT
Start: 2021-05-21 | End: 2021-05-24 | Stop reason: SDUPTHER

## 2021-05-21 RX ORDER — ERGOCALCIFEROL 1.25 MG/1
CAPSULE ORAL
Qty: 6 CAPSULE | Refills: 4 | Status: SHIPPED | OUTPATIENT
Start: 2021-05-21 | End: 2021-05-24 | Stop reason: SDUPTHER

## 2021-05-21 NOTE — TELEPHONE ENCOUNTER
----- Message from CHON Ansari sent at 5/20/2021  4:49 PM EDT -----  He is supposed to be taking Lipitor 40 mg-LDL was 68 last time.  Is he taking?  If so, increase to 80 mg    Otherwise labs look great.  Can we send you April because he is scheduled for cath next week.

## 2021-05-24 RX ORDER — ATORVASTATIN CALCIUM 80 MG/1
80 TABLET, FILM COATED ORAL DAILY
Qty: 90 TABLET | Refills: 3 | Status: SHIPPED | OUTPATIENT
Start: 2021-05-24 | End: 2022-01-12 | Stop reason: SDUPTHER

## 2021-05-24 RX ORDER — ERGOCALCIFEROL 1.25 MG/1
CAPSULE ORAL
Qty: 6 CAPSULE | Refills: 4 | Status: SHIPPED | OUTPATIENT
Start: 2021-05-24 | End: 2022-07-21

## 2021-05-27 ENCOUNTER — OUTSIDE FACILITY SERVICE (OUTPATIENT)
Dept: CARDIOLOGY | Facility: CLINIC | Age: 51
End: 2021-05-27

## 2021-05-27 PROCEDURE — 93458 L HRT ARTERY/VENTRICLE ANGIO: CPT | Performed by: INTERNAL MEDICINE

## 2021-08-19 ENCOUNTER — OFFICE VISIT (OUTPATIENT)
Dept: CARDIOLOGY | Facility: CLINIC | Age: 51
End: 2021-08-19

## 2021-08-19 VITALS
DIASTOLIC BLOOD PRESSURE: 70 MMHG | WEIGHT: 184.4 LBS | HEART RATE: 76 BPM | BODY MASS INDEX: 29.63 KG/M2 | HEIGHT: 66 IN | SYSTOLIC BLOOD PRESSURE: 112 MMHG

## 2021-08-19 DIAGNOSIS — I25.10 CORONARY ARTERY DISEASE INVOLVING NATIVE CORONARY ARTERY OF NATIVE HEART WITHOUT ANGINA PECTORIS: Primary | ICD-10-CM

## 2021-08-19 DIAGNOSIS — E78.00 HYPERCHOLESTEREMIA: ICD-10-CM

## 2021-08-19 DIAGNOSIS — I10 ESSENTIAL HYPERTENSION: ICD-10-CM

## 2021-08-19 PROCEDURE — 99214 OFFICE O/P EST MOD 30 MIN: CPT | Performed by: NURSE PRACTITIONER

## 2021-08-19 RX ORDER — CARVEDILOL 12.5 MG/1
12.5 TABLET ORAL 2 TIMES DAILY WITH MEALS
COMMUNITY
End: 2021-08-19 | Stop reason: SDUPTHER

## 2021-08-19 RX ORDER — CARVEDILOL 12.5 MG/1
12.5 TABLET ORAL 2 TIMES DAILY WITH MEALS
Qty: 180 TABLET | Refills: 3 | Status: SHIPPED | OUTPATIENT
Start: 2021-08-19 | End: 2022-01-12 | Stop reason: DRUGHIGH

## 2021-08-19 NOTE — PROGRESS NOTES
"Chief Complaint   Patient presents with   • Hospital Follow Up Visit     Post cardiac cath   • Chest Pain     Reports he had ER visit about 2 weeks ago , with increase in Coreg . He said has seen improvements since   • LABS     Results from May 2021 on chart.   • Med Refill     Needs refills on Coreg ,  90 day supply to [facility]        Subjective       Myron Lackey is a 50 y.o. 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 He follows with , managed with CellCept. In January 2018, he had stenting of 95% L PDA, and PTCA of LCX. Later in 2018, found to have patent stent with 65% RCA, acceptable FFR.       Late 2019 he was admitted to  with seizure activity felt to be possibly related to alcohol withdrawal. He was treated with Keppra but developed psychosis and was stopped as he had no further seizure activity and EEG, MRI normal. He was seen by rheumatology who felt symptoms were not related to lupus exacerbation. Coreg was added for BP management.  Stress test repeated 1/9/20 showed EF 45%, inferoseptal infarct with ranjeet-infarct ischemia. Ranexa increased to 1000 mg BID.    In July 2020, he developed significant chest discomfort and dyspnea. Cardiac cath revealed 99% distal RCA which was stented. On 11/6/20 he was seen at  for \"staring\" spells.  It was unsure if they were actually seizures.  Apparently he experienced chest pain. After discharge, stress test repeated on 11/20/2020 revealing small inferoseptal wall infarct with ranjeet-infarct ischemia similar to previous stress test (prior to RCA stent). Nitro patch started. At May 2021 visit he admitted to persistent symptoms and elective cath advised. On 5/27/21, cath revealed stenosis of LAD and RCA with acceptable FFR. L-arginine was added. A couple weeks after he developed more chest pain and went to ER. Coreg was increased. BP improved and he did " "not have reoccurence of chest pain after. It was also noted his neurologist had left  and he ran out of medication for a couple weeks prior to being able to obtain refill prescriptions. Since restarting his medication he has \"felt better\". He has appointment to establish care with Dr. Lamb and rheumatoligist at [facility].     Today he comes to the office for a hospital follow up visit. Since increase in Coreg and restarting \"seizure medication\" he has not had symptoms. He admits to being able to do more activities.     Cardiac History:    Past Surgical History:   Procedure Laterality Date   • CARDIAC CATHETERIZATION  01/29/2018    95% (L) PDA- 2.5x22 Reolute. PTCA of LCX   • CARDIAC CATHETERIZATION  10/17/2018    Patent stent, 65% RCA with FFR .85. medcial mgmt   • CARDIAC CATHETERIZATION  07/08/2020    99% Distal RCA- 2.75x26 Resolute   • CARDIAC CATHETERIZATION  05/27/2021    65% LAD .FFR 0.89. 65% RCA- FFR 0.99   • CARDIOVASCULAR STRESS TEST  12/12/2017    L. Myoview- R/O Inferior Ischemia   • CARDIOVASCULAR STRESS TEST  10/10/2018    L. Cardiolite- EF 52%. Inferior Infarct with Periinfarct Ischemia.   • CARDIOVASCULAR STRESS TEST  01/09/2020    L. Cardiolite- EF 45%. Inferoseptal Infarct with periinfarct Ischemia.   • CARDIOVASCULAR STRESS TEST  11/19/2020    Lexiscan- EF 58%. Inferoseptal Infarct with PeriInfarct Ischemia.   • ECHO - CONVERTED  12/12/2017    EF 65%   • ECHO - CONVERTED  07/08/2020    @ [facility]. EF 60%. Mild MR       Current Outpatient Medications   Medication Sig Dispense Refill   • ALPRAZolam (XANAX) 1 MG tablet Take 1 mg by mouth 3 (Three) Times a Day As Needed for Anxiety     • amLODIPine (NORVASC) 10 MG tablet Take 1 tablet by mouth Daily. 90 tablet 3   • aspirin 81 MG EC tablet Take 1 tablet by mouth Daily. 90 tablet 3   • atorvastatin (LIPITOR) 80 MG tablet Take 1 tablet by mouth Daily. 90 tablet 3   • carvedilol (COREG) 12.5 MG tablet Take 1 tablet by mouth 2 (Two) Times a Day " With Meals. 180 tablet 3   • fenofibrate micronized (LOFIBRA) 67 MG capsule Take 1 capsule by mouth Every Morning Before Breakfast. 90 capsule 3   • hydroxychloroquine (PLAQUENIL) 200 MG tablet Take 200 mg by mouth 2 (Two) Times a Day     • losartan-hydrochlorothiazide (Hyzaar) 100-25 MG per tablet Take 1 tablet by mouth Daily. 90 tablet 3   • mycophenolate (CELLCEPT) 200 MG/ML suspension Take 1,000 mg by mouth. 1000 mg in Am   1500 in PM     • nitroglycerin (NITRODUR) 0.4 MG/HR patch Place 1 patch on the skin as directed by provider See Admin Instructions. Apply patch daily, remove at night for at least 10 hours (Patient taking differently: Place 1 patch on the skin as directed by provider See Admin Instructions. Apply patch daily, remove at night for at least 10 hours only uses prn) 30 patch 11   • nitroglycerin (NITROSTAT) 0.4 MG SL tablet 1 under the tongue as needed for angina, may repeat q5mins for up three doses 25 tablet 1   • OXcarbazepine (TRILEPTAL) 300 MG tablet Take 600 mg by mouth 2 (Two) Times a Day.     • ranolazine (Ranexa) 1000 MG 12 hr tablet Take 1 tablet by mouth Every 12 (Twelve) Hours. 180 tablet 3   • ticagrelor (Brilinta) 90 MG tablet tablet Take 1 tablet by mouth 2 (Two) Times a Day. 180 tablet 3   • vitamin D (ERGOCALCIFEROL) 1.25 MG (44879 UT) capsule capsule Take one capsule weekly x 4 weeks then one per month 6 capsule 4     No current facility-administered medications for this visit.       Imdur [isosorbide nitrate], Keppra [levetiracetam], and Lisinopril    Past Medical History:   Diagnosis Date   • Cerebral vasculitis    • Epilepsy (CMS/HCC)    • History of surgery on arm     both arms   • Hyperlipidemia    • Hypertension    • Stroke (CMS/HCC) 2014   • Systemic lupus (CMS/HCC)        Social History     Socioeconomic History   • Marital status:      Spouse name: Not on file   • Number of children: Not on file   • Years of education: Not on file   • Highest education level: Not  on file   Tobacco Use   • Smoking status: Never Smoker   • Smokeless tobacco: Never Used   Vaping Use   • Vaping Use: Never used   Substance and Sexual Activity   • Alcohol use: Yes     Comment: occasional   • Drug use: No       Family History   Problem Relation Age of Onset   • Hypertension Mother    • Hyperlipidemia Mother    • Lung disease Mother    • Heart attack Mother    • Heart disease Mother    • Peripheral vascular disease Mother    • Heart attack Father    • Heart disease Father    • Hypertension Father    • Hyperlipidemia Father    • Heart attack Maternal Uncle    • Heart disease Maternal Uncle    • Hypertension Maternal Uncle    • Hyperlipidemia Maternal Uncle    • Heart attack Paternal Uncle    • Heart disease Paternal Uncle    • Hypertension Paternal Uncle    • Hyperlipidemia Paternal Uncle    • Heart disease Maternal Grandmother    • Heart attack Maternal Grandmother    • Hypertension Maternal Grandmother    • Hyperlipidemia Maternal Grandmother    • Heart attack Maternal Grandfather    • Heart disease Maternal Grandfather    • Hypertension Maternal Grandfather    • Hyperlipidemia Maternal Grandfather    • Heart attack Paternal Grandmother    • Heart disease Paternal Grandmother    • Hypertension Paternal Grandmother    • Hyperlipidemia Paternal Grandmother    • Heart attack Paternal Grandfather    • Heart disease Paternal Grandfather    • Hypertension Paternal Grandfather    • Hyperlipidemia Paternal Grandfather        Review of Systems   Constitutional: Positive for malaise/fatigue (improved). Negative for decreased appetite and diaphoresis.   HENT: Negative for nosebleeds.    Eyes: Positive for visual disturbance (not a new problem).   Cardiovascular: Negative for chest pain, claudication, cyanosis, dyspnea on exertion, irregular heartbeat, leg swelling, near-syncope, orthopnea, palpitations, paroxysmal nocturnal dyspnea and syncope.   Respiratory: Negative for shortness of breath (with exertion,  "improved).    Endocrine: Negative for cold intolerance and heat intolerance.   Hematologic/Lymphatic: Negative for adenopathy and bleeding problem. Does not bruise/bleed easily.   Skin: Negative for rash.   Musculoskeletal: Negative for myalgias.   Gastrointestinal: Negative for heartburn, melena and nausea.   Genitourinary: Negative for dysuria and hematuria.   Neurological: Negative for dizziness, headaches, light-headedness, loss of balance and seizures.   Psychiatric/Behavioral: The patient does not have insomnia and is not nervous/anxious.         BP Readings from Last 5 Encounters:   08/19/21 112/70   05/17/21 146/98   11/17/20 140/76   08/14/20 100/70   06/22/20 126/90       Wt Readings from Last 5 Encounters:   08/19/21 83.6 kg (184 lb 6.4 oz)   05/17/21 83.9 kg (185 lb)   11/17/20 84.8 kg (187 lb)   08/14/20 85.4 kg (188 lb 3.2 oz)   06/22/20 89.5 kg (197 lb 6.4 oz)       Objective     /70 (BP Location: Right arm)   Pulse 76   Ht 167.6 cm (66\")   Wt 83.6 kg (184 lb 6.4 oz)   BMI 29.76 kg/m²     Vitals and nursing note reviewed.   Eyes:      Pupils: Pupils are equal, round, and reactive to light.   HENT:      Head: Normocephalic.   Neck:      Vascular: No carotid bruit or JVD.   Pulmonary:      Breath sounds: Normal breath sounds.   Cardiovascular:      Normal rate. Regular rhythm.   Pulses:     Intact distal pulses.   Edema:     Peripheral edema absent.   Abdominal:      General: Bowel sounds are normal.      Palpations: Abdomen is soft.   Musculoskeletal: Normal range of motion.      Cervical back: Normal range of motion. Skin:     General: Skin is warm.   Neurological:      Mental Status: Alert and oriented to person, place, and time.          Procedures: none today          Assessment/Plan   Diagnoses and all orders for this visit:    1. Coronary artery disease involving native coronary artery of native heart without angina pectoris (Primary)  -     carvedilol (COREG) 12.5 MG tablet; Take 1 " tablet by mouth 2 (Two) Times a Day With Meals.  Dispense: 180 tablet; Refill: 3    2. Essential hypertension  -     carvedilol (COREG) 12.5 MG tablet; Take 1 tablet by mouth 2 (Two) Times a Day With Meals.  Dispense: 180 tablet; Refill: 3    3. Hypercholesteremia       CAD- the report of recent cardiac cath reviewed. He has two areas of stenosis, one in LAD and another in RCA, being managed medically. Currently he denies anginal symptoms.  Continue Brilinta, Ranexa, Imdur patch, statin, and aspirin.    Hypertension-blood pressure well controlled today.  Continue increased dose carvedilol.  Continue Hyzaar.    Hypercholesterolemia-continue high-dose statin therapy in form of Lipitor 80 mg daily.  He will follow with you for lab orders/management.  Please forward copy of next lab report.    Lupus-establishing care with rheumatologist at [facility].  Currently on CellCept and Plaquenil.    Seizure disorder-recent seizure activity denied.  He is establishing care with Dr. Lamb at [facility] for management.    Patient's Body mass index is 29.76 kg/m². indicating that he is overweight (BMI 25-29.9). Obesity-related health conditions include the following: hypertension, coronary heart disease and dyslipidemias. Obesity is unchanged. BMI is is above average; BMI management plan is completed. We discussed portion control..     A 3-month follow-up visit scheduled.  Please call sooner for cardiac concerns.           Electronically signed by CHON Newell,  August 19, 2021 16:46 EDT

## 2021-08-31 ENCOUNTER — TELEPHONE (OUTPATIENT)
Dept: CARDIOLOGY | Facility: CLINIC | Age: 51
End: 2021-08-31

## 2021-08-31 NOTE — TELEPHONE ENCOUNTER
Pt left message, diagnosed with Covid, was given the antibody infusion. Was told to let all his doctors know and see if there is anything else they recommend.

## 2021-10-08 DIAGNOSIS — I10 ESSENTIAL HYPERTENSION: ICD-10-CM

## 2021-10-11 RX ORDER — AMLODIPINE BESYLATE 10 MG/1
TABLET ORAL
Qty: 90 TABLET | Refills: 3 | Status: SHIPPED | OUTPATIENT
Start: 2021-10-11 | End: 2022-01-12 | Stop reason: SDUPTHER

## 2022-01-12 ENCOUNTER — OFFICE VISIT (OUTPATIENT)
Dept: CARDIOLOGY | Facility: CLINIC | Age: 52
End: 2022-01-12

## 2022-01-12 VITALS
BODY MASS INDEX: 30.6 KG/M2 | DIASTOLIC BLOOD PRESSURE: 94 MMHG | HEIGHT: 66 IN | HEART RATE: 85 BPM | WEIGHT: 190.4 LBS | SYSTOLIC BLOOD PRESSURE: 148 MMHG | TEMPERATURE: 98 F

## 2022-01-12 DIAGNOSIS — I10 ESSENTIAL HYPERTENSION: ICD-10-CM

## 2022-01-12 DIAGNOSIS — G40.909 SEIZURE DISORDER: ICD-10-CM

## 2022-01-12 DIAGNOSIS — E78.1 HYPERTRIGLYCERIDEMIA: ICD-10-CM

## 2022-01-12 DIAGNOSIS — E66.9 OBESITY (BMI 30.0-34.9): ICD-10-CM

## 2022-01-12 DIAGNOSIS — I25.10 CORONARY ARTERY DISEASE INVOLVING NATIVE CORONARY ARTERY OF NATIVE HEART WITHOUT ANGINA PECTORIS: Primary | ICD-10-CM

## 2022-01-12 DIAGNOSIS — R01.1 MURMUR, CARDIAC: ICD-10-CM

## 2022-01-12 PROCEDURE — 99214 OFFICE O/P EST MOD 30 MIN: CPT | Performed by: NURSE PRACTITIONER

## 2022-01-12 RX ORDER — CARVEDILOL 25 MG/1
25 TABLET ORAL 2 TIMES DAILY
Qty: 180 TABLET | Refills: 3 | Status: SHIPPED | OUTPATIENT
Start: 2022-01-12 | End: 2022-08-24 | Stop reason: SDUPTHER

## 2022-01-12 RX ORDER — AMLODIPINE BESYLATE 10 MG/1
10 TABLET ORAL DAILY
Qty: 90 TABLET | Refills: 3 | Status: SHIPPED | OUTPATIENT
Start: 2022-01-12 | End: 2022-08-24 | Stop reason: SDUPTHER

## 2022-01-12 RX ORDER — NITROGLYCERIN 80 MG/1
1 PATCH TRANSDERMAL SEE ADMIN INSTRUCTIONS
Qty: 30 PATCH | Refills: 11 | Status: SHIPPED | OUTPATIENT
Start: 2022-01-12 | End: 2022-08-24 | Stop reason: SDUPTHER

## 2022-01-12 RX ORDER — LOSARTAN POTASSIUM AND HYDROCHLOROTHIAZIDE 25; 100 MG/1; MG/1
1 TABLET ORAL DAILY
Qty: 90 TABLET | Refills: 3 | Status: SHIPPED | OUTPATIENT
Start: 2022-01-12 | End: 2023-02-06

## 2022-01-12 RX ORDER — ATORVASTATIN CALCIUM 80 MG/1
80 TABLET, FILM COATED ORAL DAILY
Qty: 90 TABLET | Refills: 3 | Status: SHIPPED | OUTPATIENT
Start: 2022-01-12 | End: 2022-08-24 | Stop reason: SDUPTHER

## 2022-01-12 RX ORDER — FENOFIBRATE 67 MG/1
67 CAPSULE ORAL
Qty: 90 CAPSULE | Refills: 3 | Status: SHIPPED | OUTPATIENT
Start: 2022-01-12 | End: 2022-08-24 | Stop reason: SDUPTHER

## 2022-01-12 NOTE — PROGRESS NOTES
"Chief Complaint   Patient presents with   • Follow-up     Pt is here for cardiac follow up.  He denies CP, SOB, dizziness or palpitaitons.  He does take a daily ASA.   • Med Refill     Pt request 90 day prescriptions to be sent to [facility].   • Lab Work     Pt's last labs were in May 2021.       Cardiac Complaints  none      Subjective   Myron Lackey is a 51 y.o. 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 He follows with , managed with CellCept. In January 2018, he had stenting of 95% L PDA, and PTCA of LCX. Later in 2018, found to have patent stent with 65% RCA, acceptable FFR.       Late 2019 he was admitted to  with seizure activity felt to be possibly related to alcohol withdrawal. He was treated with Keppra but developed psychosis and was stopped as he had no further seizure activity and EEG, MRI normal. He was seen by rheumatology who felt symptoms were not related to lupus exacerbation. Coreg was added for BP management.  Stress test repeated 1/9/20 showed EF 45%, inferoseptal infarct with ranjeet-infarct ischemia. Ranexa increased to 1000 mg BID.  In July 2020, he developed significant chest discomfort and dyspnea. Cardiac cath revealed 99% distal RCA which was stented. On 11/6/20 he was seen at  for \"staring\" spells.  It was unsure if they were actually seizures.  Apparently he experienced chest pain. After discharge, stress test repeated on 11/20/2020 revealing small inferoseptal wall infarct with ranjeet-infarct ischemia similar to previous stress test (prior to RCA stent). Nitro patch started. At May 2021 visit he admitted to persistent symptoms and elective cath advised. On 5/27/21, cath revealed stenosis of LAD and RCA with acceptable FFR. L-arginine was added. A couple weeks after he developed more chest pain and went to ER. Coreg was increased. BP improved and he did not have " "reoccurence of chest pain after. It was also noted his neurologist had left  and he ran out of medication for a couple weeks prior to being able to obtain refill prescriptions. Since restarting his medication he has \"felt better\". He has appointment to establish care with Dr. Lamb and rheumatoligist at [facility].     Patient comes today for follow up and denies any new concerns. From a cardiac standpoint, he reports doing well. No CP, SOA, dizziness, or palpitations reported. Labs have remained followed by PCP and were checked May 2021.  Refills requested for 90 day supply.                Cardiac History  Past Surgical History:   Procedure Laterality Date   • CARDIAC CATHETERIZATION  01/29/2018    95% (L) PDA- 2.5x22 Reolute. PTCA of LCX   • CARDIAC CATHETERIZATION  10/17/2018    Patent stent, 65% RCA with FFR .85. medcial mgmt   • CARDIAC CATHETERIZATION  07/08/2020    99% Distal RCA- 2.75x26 Resolute   • CARDIAC CATHETERIZATION  05/27/2021    65% LAD .FFR 0.89. 65% RCA- FFR 0.99   • CARDIOVASCULAR STRESS TEST  12/12/2017    L. Myoview- R/O Inferior Ischemia   • CARDIOVASCULAR STRESS TEST  10/10/2018    L. Cardiolite- EF 52%. Inferior Infarct with Periinfarct Ischemia.   • CARDIOVASCULAR STRESS TEST  01/09/2020    L. Cardiolite- EF 45%. Inferoseptal Infarct with periinfarct Ischemia.   • CARDIOVASCULAR STRESS TEST  11/19/2020    Lexiscan- EF 58%. Inferoseptal Infarct with PeriInfarct Ischemia.   • ECHO - CONVERTED  12/12/2017    EF 65%   • ECHO - CONVERTED  07/08/2020    @ [facility]. EF 60%. Mild MR       Current Outpatient Medications   Medication Sig Dispense Refill   • ALPRAZolam (XANAX) 1 MG tablet Take 1 mg by mouth 3 (Three) Times a Day As Needed for Anxiety     • amLODIPine (NORVASC) 10 MG tablet Take 1 tablet by mouth Daily. for blood pressure 90 tablet 3   • aspirin 81 MG EC tablet Take 1 tablet by mouth Daily. 90 tablet 3   • atorvastatin (LIPITOR) 80 MG tablet Take 1 tablet by mouth Daily. 90 tablet " 3   • fenofibrate micronized (LOFIBRA) 67 MG capsule Take 1 capsule by mouth Every Morning Before Breakfast. 90 capsule 3   • hydroxychloroquine (PLAQUENIL) 200 MG tablet Take 200 mg by mouth 2 (Two) Times a Day     • losartan-hydrochlorothiazide (Hyzaar) 100-25 MG per tablet Take 1 tablet by mouth Daily. 90 tablet 3   • mycophenolate (CELLCEPT) 200 MG/ML suspension Take 1,000 mg by mouth. 1000 mg in Am   1500 in PM     • nitroglycerin (NITRODUR) 0.4 MG/HR patch Place 1 patch on the skin as directed by provider See Admin Instructions. Apply patch daily, remove at night for at least 10 hours 30 patch 11   • nitroglycerin (NITROSTAT) 0.4 MG SL tablet 1 under the tongue as needed for angina, may repeat q5mins for up three doses 25 tablet 1   • OXcarbazepine (TRILEPTAL) 300 MG tablet Take 600 mg by mouth 2 (Two) Times a Day.     • ticagrelor (Brilinta) 90 MG tablet tablet Take 1 tablet by mouth 2 (Two) Times a Day. 180 tablet 3   • vitamin D (ERGOCALCIFEROL) 1.25 MG (58457 UT) capsule capsule Take one capsule weekly x 4 weeks then one per month 6 capsule 4   • carvedilol (COREG) 25 MG tablet Take 1 tablet by mouth 2 (Two) Times a Day. 180 tablet 3     No current facility-administered medications for this visit.       Imdur [isosorbide nitrate], Keppra [levetiracetam], and Lisinopril    Past Medical History:   Diagnosis Date   • Cerebral vasculitis    • Epilepsy (HCC)    • History of surgery on arm     both arms   • Hyperlipidemia    • Hypertension    • Stroke (HCC) 2014   • Systemic lupus (HCC)        Social History     Socioeconomic History   • Marital status:    Tobacco Use   • Smoking status: Never Smoker   • Smokeless tobacco: Never Used   Vaping Use   • Vaping Use: Never used   Substance and Sexual Activity   • Alcohol use: Yes     Comment: occasional   • Drug use: No       Family History   Problem Relation Age of Onset   • Hypertension Mother    • Hyperlipidemia Mother    • Lung disease Mother    • Heart  attack Mother    • Heart disease Mother    • Peripheral vascular disease Mother    • Heart attack Father    • Heart disease Father    • Hypertension Father    • Hyperlipidemia Father    • Heart attack Maternal Uncle    • Heart disease Maternal Uncle    • Hypertension Maternal Uncle    • Hyperlipidemia Maternal Uncle    • Heart attack Paternal Uncle    • Heart disease Paternal Uncle    • Hypertension Paternal Uncle    • Hyperlipidemia Paternal Uncle    • Heart disease Maternal Grandmother    • Heart attack Maternal Grandmother    • Hypertension Maternal Grandmother    • Hyperlipidemia Maternal Grandmother    • Heart attack Maternal Grandfather    • Heart disease Maternal Grandfather    • Hypertension Maternal Grandfather    • Hyperlipidemia Maternal Grandfather    • Heart attack Paternal Grandmother    • Heart disease Paternal Grandmother    • Hypertension Paternal Grandmother    • Hyperlipidemia Paternal Grandmother    • Heart attack Paternal Grandfather    • Heart disease Paternal Grandfather    • Hypertension Paternal Grandfather    • Hyperlipidemia Paternal Grandfather        Review of Systems   Constitutional: Negative for malaise/fatigue and night sweats.   Cardiovascular: Negative for chest pain, claudication, dyspnea on exertion, irregular heartbeat, leg swelling, near-syncope, orthopnea, palpitations and syncope.   Respiratory: Negative for cough, shortness of breath and wheezing.    Musculoskeletal: Positive for stiffness. Negative for back pain and joint pain.   Gastrointestinal: Negative for anorexia, heartburn, melena, nausea and vomiting.   Genitourinary: Negative for dysuria, hematuria, hesitancy and nocturia.   Neurological: Negative for dizziness, headaches, light-headedness and loss of balance.   Psychiatric/Behavioral: Negative for depression and memory loss. The patient is not nervous/anxious.            Objective     /94 (BP Location: Left arm, Patient Position: Sitting)   Pulse 85   Temp  "98 °F (36.7 °C)   Ht 167.6 cm (66\")   Wt 86.4 kg (190 lb 6.4 oz)   BMI 30.73 kg/m²     Constitutional:       Appearance: Not in distress.   Eyes:      Pupils: Pupils are equal, round, and reactive to light.   HENT:      Nose: Nose normal.   Pulmonary:      Effort: Pulmonary effort is normal.      Breath sounds: Normal breath sounds.   Cardiovascular:      PMI at left midclavicular line. Normal rate. Regular rhythm.      Murmurs: There is a systolic murmur.   Abdominal:      Palpations: Abdomen is soft.   Musculoskeletal: Normal range of motion.      Cervical back: Normal range of motion and neck supple. Skin:     General: Skin is warm and dry.   Neurological:      Mental Status: Oriented to person, place and time.         Procedures    Assessment/Plan       CAD- He has two areas of stenosis, one in LAD and another in RCA, being managed medically per recent cath. Currently he denies anginal symptoms.  Continue Brilinta, Imdur patch, statin, and aspirin.  He has stopped ranexa therapy, no chest pain with d/c.  No repeat workup recommended.     Hypertension-blood pressure elevated.  Increase coreg to 25mg BID. Continue Hyzaar. Limited sodium advised.     Hypercholesterolemia-continue high-dose statin therapy in form of Lipitor 80 mg daily.  He will follow with you for lab orders/management.  Please forward copy of next lab report.     Lupus- Seeing specialty at .  Currently on CellCept and Plaquenil.     Seizure disorder-recent seizure activity denied.  Now back at [facility] for seizure management.     Patient's Body mass index is 30.73 kg/m². Good cardiac diet with limited sodium advised, limited caloric intake, and walking regimen.    Refills per request.    6 month follow up advised or sooner if needed.        Problems Addressed this Visit        Cardiac and Vasculature    Essential hypertension    Relevant Medications    carvedilol (COREG) 25 MG tablet    losartan-hydrochlorothiazide (Hyzaar) 100-25 MG " per tablet    amLODIPine (NORVASC) 10 MG tablet    Murmur, cardiac    Coronary artery disease involving native coronary artery of native heart without angina pectoris - Primary    Relevant Medications    carvedilol (COREG) 25 MG tablet    ticagrelor (Brilinta) 90 MG tablet tablet    nitroglycerin (NITRODUR) 0.4 MG/HR patch    amLODIPine (NORVASC) 10 MG tablet      Other Visit Diagnoses     Hypertriglyceridemia        Relevant Medications    fenofibrate micronized (LOFIBRA) 67 MG capsule    atorvastatin (LIPITOR) 80 MG tablet    Seizure disorder (HCC)        Obesity (BMI 30.0-34.9)          Diagnoses       Codes Comments    Coronary artery disease involving native coronary artery of native heart without angina pectoris    -  Primary ICD-10-CM: I25.10  ICD-9-CM: 414.01     Essential hypertension     ICD-10-CM: I10  ICD-9-CM: 401.9     Hypertriglyceridemia     ICD-10-CM: E78.1  ICD-9-CM: 272.1     Murmur, cardiac     ICD-10-CM: R01.1  ICD-9-CM: 785.2     Seizure disorder (HCC)     ICD-10-CM: G40.909  ICD-9-CM: 345.90     Obesity (BMI 30.0-34.9)     ICD-10-CM: E66.9  ICD-9-CM: 278.00           Patient's Body mass index is 30.73 kg/m². indicating that he is obese. Good cardiac diet with limited carbs, calories, and activity as tolerated advised.           Electronically signed by CHON Li January 12, 2022 17:50 EST

## 2022-07-21 RX ORDER — ERGOCALCIFEROL 1.25 MG/1
CAPSULE ORAL
Qty: 6 CAPSULE | Refills: 4 | Status: SHIPPED | OUTPATIENT
Start: 2022-07-21 | End: 2022-08-24

## 2022-08-24 ENCOUNTER — OFFICE VISIT (OUTPATIENT)
Dept: CARDIOLOGY | Facility: CLINIC | Age: 52
End: 2022-08-24

## 2022-08-24 VITALS
HEART RATE: 70 BPM | SYSTOLIC BLOOD PRESSURE: 122 MMHG | HEIGHT: 66 IN | BODY MASS INDEX: 30.28 KG/M2 | WEIGHT: 188.4 LBS | DIASTOLIC BLOOD PRESSURE: 78 MMHG

## 2022-08-24 DIAGNOSIS — I10 ESSENTIAL HYPERTENSION: ICD-10-CM

## 2022-08-24 DIAGNOSIS — I25.10 CAD, MULTIPLE VESSEL: ICD-10-CM

## 2022-08-24 DIAGNOSIS — R07.89 CHEST TIGHTNESS: ICD-10-CM

## 2022-08-24 DIAGNOSIS — I20.9 ANGINAL SYNDROME: Primary | ICD-10-CM

## 2022-08-24 DIAGNOSIS — E66.9 OBESITY (BMI 30.0-34.9): ICD-10-CM

## 2022-08-24 DIAGNOSIS — M32.9 LUPUS: ICD-10-CM

## 2022-08-24 DIAGNOSIS — E78.1 HYPERTRIGLYCERIDEMIA: ICD-10-CM

## 2022-08-24 DIAGNOSIS — G40.909 SEIZURE DISORDER: ICD-10-CM

## 2022-08-24 PROCEDURE — 99214 OFFICE O/P EST MOD 30 MIN: CPT | Performed by: NURSE PRACTITIONER

## 2022-08-24 RX ORDER — ASPIRIN 81 MG/1
81 TABLET ORAL DAILY
Qty: 30 TABLET | Refills: 8
Start: 2022-08-24 | End: 2023-03-02

## 2022-08-24 RX ORDER — OMEPRAZOLE 20 MG/1
20 CAPSULE, DELAYED RELEASE ORAL DAILY
COMMUNITY

## 2022-08-24 RX ORDER — FENOFIBRATE 67 MG/1
67 CAPSULE ORAL
Qty: 90 CAPSULE | Refills: 3 | Status: SHIPPED | OUTPATIENT
Start: 2022-08-24 | End: 2023-03-05 | Stop reason: SDUPTHER

## 2022-08-24 RX ORDER — NITROGLYCERIN 80 MG/1
1 PATCH TRANSDERMAL SEE ADMIN INSTRUCTIONS
Qty: 30 PATCH | Refills: 11 | Status: SHIPPED | OUTPATIENT
Start: 2022-08-24 | End: 2023-03-05 | Stop reason: SDUPTHER

## 2022-08-24 RX ORDER — CARVEDILOL 25 MG/1
25 TABLET ORAL 2 TIMES DAILY
Qty: 180 TABLET | Refills: 3 | Status: SHIPPED | OUTPATIENT
Start: 2022-08-24 | End: 2023-03-05 | Stop reason: SDUPTHER

## 2022-08-24 RX ORDER — AMLODIPINE BESYLATE 10 MG/1
10 TABLET ORAL DAILY
Qty: 90 TABLET | Refills: 3 | Status: SHIPPED | OUTPATIENT
Start: 2022-08-24 | End: 2022-11-23

## 2022-08-24 RX ORDER — ATORVASTATIN CALCIUM 80 MG/1
80 TABLET, FILM COATED ORAL DAILY
Qty: 90 TABLET | Refills: 3 | Status: SHIPPED | OUTPATIENT
Start: 2022-08-24 | End: 2023-03-02

## 2022-08-24 NOTE — PROGRESS NOTES
"Chief Complaint   Patient presents with   • Follow-up     Pt is here for cardiac follow up. Pt denies feeling any SOB, chest pains, heart palpitations, or dizziness. Pt denies taking a daily ASA.      • Med Refill     Pt request 30 day refills to be sent to [facility]. Pt will bring medication list back.      • Lab Work     Pt states their last labs were 8/17/2022.         Cardiac Complaints  none      Subjective   Myron Lackey is a 51 y.o. 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 He follows with , managed with CellCept. In January 2018, he had stenting of 95% L PDA, and PTCA of LCX. Later in 2018, found to have patent stent with 65% RCA, acceptable FFR.  In 2019, went to  with seizure activity, possibly related to ETOH withdrawal, but all workup negative. Initally treated with Keppra, later discontinued due to psychosis. He then was treated for LUPUS flare. On 11/6/20 he was seen at  for \"staring\" spells.  It was unsure if they were actually seizures.  Apparently he experienced chest pain. After discharge, stress test repeated on 11/20/2020 revealing small inferoseptal wall infarct with ranjeet-infarct ischemia similar to previous stress test (prior to RCA stent). Nitro patch started. At May 2021 visit he admitted to persistent symptoms and elective cath advised. On 5/27/21, cath revealed stenosis of LAD and RCA with acceptable FFR. L-arginine was added. He then developed chest pain after, but admitted he stopped taking meds, once he restarted them, he felt much better.     He comes today for follow up and reports more fatigue. He does admit he can walk a mile without concerns, but admits he does have some pressure with lifting a weed eater, which gets better after rest.  No SOA, palpitations, dzziness, or syncope noted. Labs done were done August 2022 reviewed and showed: " Creatinine 0.73, BUN 10, Na 137, K 3.9, AST 22, ALT 37, , TSH 2.12. Refills requested.  Will be having workup soon at  for seizure management.        Cardiac History  Past Surgical History:   Procedure Laterality Date   • CARDIAC CATHETERIZATION  01/29/2018    95% (L) PDA- 2.5x22 Reolute. PTCA of LCX   • CARDIAC CATHETERIZATION  10/17/2018    Patent stent, 65% RCA with FFR .85. [facility] mgmt   • CARDIAC CATHETERIZATION  07/08/2020    99% Distal RCA- 2.75x26 Resolute   • CARDIAC CATHETERIZATION  05/27/2021    65% LAD .FFR 0.89. 65% RCA- FFR 0.99   • CARDIOVASCULAR STRESS TEST  12/12/2017    L. Myoview- R/O Inferior Ischemia   • CARDIOVASCULAR STRESS TEST  10/10/2018    L. Cardiolite- EF 52%. Inferior Infarct with Periinfarct Ischemia.   • CARDIOVASCULAR STRESS TEST  01/09/2020    L. Cardiolite- EF 45%. Inferoseptal Infarct with periinfarct Ischemia.   • CARDIOVASCULAR STRESS TEST  11/19/2020    Lexiscan- EF 58%. Inferoseptal Infarct with PeriInfarct Ischemia.   • ECHO - CONVERTED  12/12/2017    EF 65%   • ECHO - CONVERTED  07/08/2020    @ [facility]. EF 60%. Mild MR       Current Outpatient Medications   Medication Sig Dispense Refill   • ALPRAZolam (XANAX) 2 MG tablet Take 2 mg by mouth 3 (Three) Times a Day As Needed for Anxiety.     • amLODIPine (NORVASC) 10 MG tablet Take 1 tablet by mouth Daily. for blood pressure 90 tablet 3   • atorvastatin (LIPITOR) 80 MG tablet Take 1 tablet by mouth Daily. 90 tablet 3   • carvedilol (COREG) 25 MG tablet Take 1 tablet by mouth 2 (Two) Times a Day. 180 tablet 3   • fenofibrate micronized (LOFIBRA) 67 MG capsule Take 1 capsule by mouth Every Morning Before Breakfast. 90 capsule 3   • hydroxychloroquine (PLAQUENIL) 200 MG tablet Take 200 mg by mouth 2 (Two) Times a Day     • losartan-hydrochlorothiazide (Hyzaar) 100-25 MG per tablet Take 1 tablet by mouth Daily. 90 tablet 3   • mycophenolate (CELLCEPT) 200 MG/ML suspension Take 1,000 mg by mouth. 1000 mg in Am   1500 in PM      • nitroglycerin (NITRODUR) 0.4 MG/HR patch Place 1 patch on the skin as directed by provider See Admin Instructions. Apply patch daily, remove at night for at least 10 hours 30 patch 11   • nitroglycerin (NITROSTAT) 0.4 MG SL tablet 1 under the tongue as needed for angina, may repeat q5mins for up three doses 25 tablet 1   • omeprazole (priLOSEC) 20 MG capsule Take 20 mg by mouth Daily.     • OXcarbazepine (TRILEPTAL) 300 MG tablet Take 600 mg by mouth 2 (Two) Times a Day.     • ticagrelor (Brilinta) 90 MG tablet tablet Take 1 tablet by mouth 2 (Two) Times a Day. 180 tablet 3   • aspirin (aspirin) 81 MG EC tablet Take 1 tablet by mouth Daily. 30 tablet 8     No current facility-administered medications for this visit.       Imdur [isosorbide nitrate], Keppra [levetiracetam], and Lisinopril    Past Medical History:   Diagnosis Date   • Cerebral vasculitis    • Epilepsy (HCC)    • History of surgery on arm     both arms   • Hyperlipidemia    • Hypertension    • Stroke (HCC) 2014   • Systemic lupus (HCC)        Social History     Socioeconomic History   • Marital status:    Tobacco Use   • Smoking status: Never Smoker   • Smokeless tobacco: Never Used   Vaping Use   • Vaping Use: Never used   Substance and Sexual Activity   • Alcohol use: Yes     Comment: occasional   • Drug use: No       Family History   Problem Relation Age of Onset   • Hypertension Mother    • Hyperlipidemia Mother    • Lung disease Mother    • Heart attack Mother    • Heart disease Mother    • Peripheral vascular disease Mother    • Heart attack Father    • Heart disease Father    • Hypertension Father    • Hyperlipidemia Father    • Heart attack Maternal Uncle    • Heart disease Maternal Uncle    • Hypertension Maternal Uncle    • Hyperlipidemia Maternal Uncle    • Heart attack Paternal Uncle    • Heart disease Paternal Uncle    • Hypertension Paternal Uncle    • Hyperlipidemia Paternal Uncle    • Heart disease Maternal Grandmother    •  "Heart attack Maternal Grandmother    • Hypertension Maternal Grandmother    • Hyperlipidemia Maternal Grandmother    • Heart attack Maternal Grandfather    • Heart disease Maternal Grandfather    • Hypertension Maternal Grandfather    • Hyperlipidemia Maternal Grandfather    • Heart attack Paternal Grandmother    • Heart disease Paternal Grandmother    • Hypertension Paternal Grandmother    • Hyperlipidemia Paternal Grandmother    • Heart attack Paternal Grandfather    • Heart disease Paternal Grandfather    • Hypertension Paternal Grandfather    • Hyperlipidemia Paternal Grandfather        Review of Systems   Constitutional: Positive for malaise/fatigue. Negative for night sweats.   Cardiovascular: Positive for chest pain and dyspnea on exertion. Negative for irregular heartbeat, leg swelling, near-syncope, orthopnea, palpitations and syncope.   Respiratory: Negative for cough, shortness of breath and wheezing.    Musculoskeletal: Positive for stiffness. Negative for back pain and joint pain.   Gastrointestinal: Negative for anorexia, heartburn, melena, nausea and vomiting.   Neurological: Negative for dizziness, headaches, light-headedness and seizures.   Psychiatric/Behavioral: Negative for depression and memory loss. The patient is not nervous/anxious.            Objective     /78 (BP Location: Left arm, Patient Position: Sitting)   Pulse 70   Ht 167.6 cm (66\")   Wt 85.5 kg (188 lb 6.4 oz)   BMI 30.41 kg/m²     Constitutional:       Appearance: Not in distress.   Eyes:      Pupils: Pupils are equal, round, and reactive to light.   HENT:      Nose: Nose normal.   Pulmonary:      Effort: Pulmonary effort is normal.      Breath sounds: Normal breath sounds.   Cardiovascular:      PMI at left midclavicular line. Normal rate. Regular rhythm.      Murmurs: There is a systolic murmur.   Abdominal:      Palpations: Abdomen is soft.   Musculoskeletal: Normal range of motion.      Cervical back: Normal range of " motion and neck supple. Skin:     General: Skin is warm and dry.   Neurological:      Mental Status: Alert.   Psychiatric:         Attention and Perception: Attention normal.         Mood and Affect: Mood is depressed.         Procedures         Diagnoses and all orders for this visit:    1. Anginal syndrome (HCC) (Primary)  -     Stress Test With Myocardial Perfusion One Day; Future    2. CAD, multiple vessel  -     Stress Test With Myocardial Perfusion One Day; Future    3. Chest tightness  -     Stress Test With Myocardial Perfusion One Day; Future    4. Essential hypertension  -     amLODIPine (NORVASC) 10 MG tablet; Take 1 tablet by mouth Daily. for blood pressure  Dispense: 90 tablet; Refill: 3    5. Hypertriglyceridemia  -     fenofibrate micronized (LOFIBRA) 67 MG capsule; Take 1 capsule by mouth Every Morning Before Breakfast.  Dispense: 90 capsule; Refill: 3    6. Seizure disorder (HCC)    7. Lupus (HCC)    8. Obesity (BMI 30.0-34.9)    Other orders  -     ticagrelor (Brilinta) 90 MG tablet tablet; Take 1 tablet by mouth 2 (Two) Times a Day.  Dispense: 180 tablet; Refill: 3  -     atorvastatin (LIPITOR) 80 MG tablet; Take 1 tablet by mouth Daily.  Dispense: 90 tablet; Refill: 3  -     carvedilol (COREG) 25 MG tablet; Take 1 tablet by mouth 2 (Two) Times a Day.  Dispense: 180 tablet; Refill: 3  -     aspirin (aspirin) 81 MG EC tablet; Take 1 tablet by mouth Daily.  Dispense: 30 tablet; Refill: 8  -     nitroglycerin (NITRODUR) 0.4 MG/HR patch; Place 1 patch on the skin as directed by provider See Admin Instructions. Apply patch daily, remove at night for at least 10 hours  Dispense: 30 patch; Refill: 11             CAD- He has two areas of stenosis, one in LAD and another in RCA at 65%, being managed medically per recent cath 2021, he does have anginal equivalents. Continue Brilinta, Imdur patch, and statin. Aspirin added back to current. After long discussion, he is willing to proceed with stress testing.  More recommendations to follow.    Hypertension-blood pressure better managed since coreg increased to 25mg BID. He will continue same hyzaar and norvasc at same. Limited sodium advised.     Hypercholesterolemia-continue high-dose statin therapy in form of Lipitor 80 mg daily and lofibra for trig control.  He will follow with you for lab orders/management.  Please forward copy of next lab report.     Lupus- Seeing specialty at .  Currently on CellCept and Plaquenil.     Seizure disorder-recent seizure activity denied.  Now back at [facility] for seizure management, will be staying several days for workup soon.     Patient's Body mass index is 30.41 kg/m². Good cardiac diet with limited sodium advised, limited caloric intake, and walking regimen.     Refills per request.     6 month follow up advised or sooner if needed.      Problems Addressed this Visit        Cardiac and Vasculature    Essential hypertension    Relevant Medications    amLODIPine (NORVASC) 10 MG tablet    carvedilol (COREG) 25 MG tablet      Other Visit Diagnoses     Anginal syndrome (HCC)    -  Primary    Relevant Medications    ticagrelor (Brilinta) 90 MG tablet tablet    amLODIPine (NORVASC) 10 MG tablet    carvedilol (COREG) 25 MG tablet    nitroglycerin (NITRODUR) 0.4 MG/HR patch    Other Relevant Orders    Stress Test With Myocardial Perfusion One Day    CAD, multiple vessel        Relevant Medications    ticagrelor (Brilinta) 90 MG tablet tablet    amLODIPine (NORVASC) 10 MG tablet    carvedilol (COREG) 25 MG tablet    nitroglycerin (NITRODUR) 0.4 MG/HR patch    Other Relevant Orders    Stress Test With Myocardial Perfusion One Day    Chest tightness        Relevant Orders    Stress Test With Myocardial Perfusion One Day    Hypertriglyceridemia        Relevant Medications    atorvastatin (LIPITOR) 80 MG tablet    fenofibrate micronized (LOFIBRA) 67 MG capsule    Seizure disorder (HCC)        Lupus (HCC)        Obesity (BMI 30.0-34.9)           Diagnoses       Codes Comments    Anginal syndrome (HCC)    -  Primary ICD-10-CM: I20.9  ICD-9-CM: 413.9     CAD, multiple vessel     ICD-10-CM: I25.10  ICD-9-CM: 414.00     Chest tightness     ICD-10-CM: R07.89  ICD-9-CM: 786.59     Essential hypertension     ICD-10-CM: I10  ICD-9-CM: 401.9     Hypertriglyceridemia     ICD-10-CM: E78.1  ICD-9-CM: 272.1     Seizure disorder (HCC)     ICD-10-CM: G40.909  ICD-9-CM: 345.90     Lupus (HCC)     ICD-10-CM: M32.9  ICD-9-CM: 710.0     Obesity (BMI 30.0-34.9)     ICD-10-CM: E66.9  ICD-9-CM: 278.00                   Electronically signed by Betsey Sanders, APRN August 24, 2022 09:37 EDT

## 2022-08-30 ENCOUNTER — HOSPITAL ENCOUNTER (OUTPATIENT)
Dept: CARDIOLOGY | Facility: HOSPITAL | Age: 52
Discharge: HOME | End: 2022-08-30

## 2022-08-30 DIAGNOSIS — I20.9 ANGINAL SYNDROME: ICD-10-CM

## 2022-08-30 DIAGNOSIS — R07.89 CHEST TIGHTNESS: ICD-10-CM

## 2022-08-30 DIAGNOSIS — I25.10 CAD, MULTIPLE VESSEL: ICD-10-CM

## 2022-08-30 LAB
BH CV REST NUCLEAR ISOTOPE DOSE: 10 MCI
BH CV STRESS COMMENTS STAGE 1: NORMAL
BH CV STRESS DOSE REGADENOSON STAGE 1: 0.4
BH CV STRESS DURATION MIN STAGE 1: 0
BH CV STRESS DURATION SEC STAGE 1: 10
BH CV STRESS NUCLEAR ISOTOPE DOSE: 30 MCI
BH CV STRESS PROTOCOL 1: NORMAL
BH CV STRESS RECOVERY BP: NORMAL MMHG
BH CV STRESS RECOVERY HR: 77 BPM
BH CV STRESS STAGE 1: 1
LV EF NUC BP: 52 %
MAXIMAL PREDICTED HEART RATE: 169 BPM
PERCENT MAX PREDICTED HR: 50.89 %
STRESS BASELINE BP: NORMAL MMHG
STRESS BASELINE HR: 59 BPM
STRESS PERCENT HR: 60 %
STRESS POST PEAK BP: NORMAL MMHG
STRESS POST PEAK HR: 86 BPM
STRESS TARGET HR: 144 BPM

## 2022-08-30 PROCEDURE — A9500 TC99M SESTAMIBI: HCPCS | Performed by: INTERNAL MEDICINE

## 2022-08-30 PROCEDURE — 78452 HT MUSCLE IMAGE SPECT MULT: CPT | Performed by: INTERNAL MEDICINE

## 2022-08-30 PROCEDURE — 93017 CV STRESS TEST TRACING ONLY: CPT

## 2022-08-30 PROCEDURE — 0 TECHNETIUM SESTAMIBI: Performed by: INTERNAL MEDICINE

## 2022-08-30 PROCEDURE — 93018 CV STRESS TEST I&R ONLY: CPT | Performed by: INTERNAL MEDICINE

## 2022-08-30 PROCEDURE — 78452 HT MUSCLE IMAGE SPECT MULT: CPT

## 2022-08-30 PROCEDURE — 25010000002 REGADENOSON 0.4 MG/5ML SOLUTION: Performed by: NURSE PRACTITIONER

## 2022-08-30 RX ADMIN — REGADENOSON 0.4 MG: 0.08 INJECTION, SOLUTION INTRAVENOUS at 13:49

## 2022-08-30 RX ADMIN — TECHNETIUM TC 99M SESTAMIBI 1 DOSE: 1 INJECTION INTRAVENOUS at 14:17

## 2022-08-30 RX ADMIN — TECHNETIUM TC 99M SESTAMIBI 1 DOSE: 1 INJECTION INTRAVENOUS at 13:25

## 2022-09-02 ENCOUNTER — APPOINTMENT (OUTPATIENT)
Dept: CARDIOLOGY | Facility: HOSPITAL | Age: 52
End: 2022-09-02

## 2022-09-13 ENCOUNTER — APPOINTMENT (OUTPATIENT)
Dept: CARDIOLOGY | Facility: HOSPITAL | Age: 52
End: 2022-09-13

## 2022-11-23 DIAGNOSIS — I10 ESSENTIAL HYPERTENSION: ICD-10-CM

## 2022-11-23 RX ORDER — AMLODIPINE BESYLATE 10 MG/1
10 TABLET ORAL DAILY
Qty: 90 TABLET | Refills: 3 | Status: SHIPPED | OUTPATIENT
Start: 2022-11-23 | End: 2023-03-05 | Stop reason: SDUPTHER

## 2023-02-06 RX ORDER — LOSARTAN POTASSIUM AND HYDROCHLOROTHIAZIDE 25; 100 MG/1; MG/1
1 TABLET ORAL DAILY
Qty: 90 TABLET | Refills: 0 | Status: SHIPPED | OUTPATIENT
Start: 2023-02-06 | End: 2023-03-05 | Stop reason: SDUPTHER

## 2023-02-16 ENCOUNTER — OUTSIDE FACILITY SERVICE (OUTPATIENT)
Dept: CARDIOLOGY | Facility: CLINIC | Age: 53
End: 2023-02-16
Payer: MEDICARE

## 2023-02-16 ENCOUNTER — TELEPHONE (OUTPATIENT)
Dept: CARDIOLOGY | Facility: CLINIC | Age: 53
End: 2023-02-16

## 2023-02-16 PROCEDURE — 99215 OFFICE O/P EST HI 40 MIN: CPT | Performed by: INTERNAL MEDICINE

## 2023-02-16 NOTE — TELEPHONE ENCOUNTER
Caller: Myron Lackey    Relationship: Self    Best call back number:[phone]    What is the best time to reach you: ANY    Who are you requesting to speak with (clinical staff, provider,  specific staff member): CLINICAL       What was the call regarding:PATIENT WAS SEEN AT THE ER LAST WEEK THEY DID BLOOD ENZYMES.. HE WAS HAVING CHEST PAIN THEY SAID HE WAS NOT HAVING A HEART ATTACK AND SENT HIM HOME.  HE WOULD LIKE TO ALSO KNOW ABOUT BLOOD WORK THAT MAY NEED TO BE DONE.    Do you require a callback: YES

## 2023-02-16 NOTE — TELEPHONE ENCOUNTER
When pulling records for lab results, pt was found to be currently in ER. Last labs at [facility] were about 2 weeks ago.  Troponin was 5.  Labs are being scanned into his chart.

## 2023-02-16 NOTE — TELEPHONE ENCOUNTER
I don't think he needs more labs. He has an appt with Patricia 3/2, keep that appt. If she wants more labs, she can order what she would like done then.

## 2023-02-17 ENCOUNTER — OUTSIDE FACILITY SERVICE (OUTPATIENT)
Dept: CARDIOLOGY | Facility: CLINIC | Age: 53
End: 2023-02-17
Payer: MEDICARE

## 2023-02-17 PROCEDURE — 93321 DOPPLER ECHO F-UP/LMTD STD: CPT | Performed by: INTERNAL MEDICINE

## 2023-02-17 PROCEDURE — 93325 DOPPLER ECHO COLOR FLOW MAPG: CPT | Performed by: INTERNAL MEDICINE

## 2023-02-17 PROCEDURE — 93308 TTE F-UP OR LMTD: CPT | Performed by: INTERNAL MEDICINE

## 2023-02-17 PROCEDURE — 78452 HT MUSCLE IMAGE SPECT MULT: CPT | Performed by: INTERNAL MEDICINE

## 2023-02-17 PROCEDURE — 99214 OFFICE O/P EST MOD 30 MIN: CPT | Performed by: INTERNAL MEDICINE

## 2023-02-17 PROCEDURE — 93018 CV STRESS TEST I&R ONLY: CPT | Performed by: INTERNAL MEDICINE

## 2023-02-20 NOTE — TELEPHONE ENCOUNTER
Pt made aware of provider recommendations. States he was in hospital. Seen Dr barber.  Will get hospital records.

## 2023-03-02 ENCOUNTER — OFFICE VISIT (OUTPATIENT)
Dept: CARDIOLOGY | Facility: CLINIC | Age: 53
End: 2023-03-02
Payer: MEDICARE

## 2023-03-02 VITALS
DIASTOLIC BLOOD PRESSURE: 68 MMHG | BODY MASS INDEX: 31.4 KG/M2 | HEIGHT: 66 IN | HEART RATE: 60 BPM | SYSTOLIC BLOOD PRESSURE: 100 MMHG | WEIGHT: 195.4 LBS

## 2023-03-02 DIAGNOSIS — R07.89 CHEST PRESSURE: Primary | ICD-10-CM

## 2023-03-02 DIAGNOSIS — I10 ESSENTIAL HYPERTENSION: ICD-10-CM

## 2023-03-02 DIAGNOSIS — R06.02 SHORTNESS OF BREATH: ICD-10-CM

## 2023-03-02 DIAGNOSIS — I67.7 CEREBRAL VASCULITIS: ICD-10-CM

## 2023-03-02 DIAGNOSIS — E78.00 HYPERCHOLESTEREMIA: ICD-10-CM

## 2023-03-02 DIAGNOSIS — I25.10 CORONARY ARTERY DISEASE INVOLVING NATIVE CORONARY ARTERY OF NATIVE HEART WITHOUT ANGINA PECTORIS: ICD-10-CM

## 2023-03-02 DIAGNOSIS — E78.1 HYPERTRIGLYCERIDEMIA: ICD-10-CM

## 2023-03-02 DIAGNOSIS — R12 HEART BURN: ICD-10-CM

## 2023-03-02 PROCEDURE — 99214 OFFICE O/P EST MOD 30 MIN: CPT | Performed by: NURSE PRACTITIONER

## 2023-03-02 RX ORDER — ERGOCALCIFEROL 1.25 MG/1
50000 CAPSULE ORAL
COMMUNITY

## 2023-03-02 RX ORDER — ROSUVASTATIN CALCIUM 40 MG/1
40 TABLET, COATED ORAL DAILY
COMMUNITY
End: 2023-03-05 | Stop reason: SDUPTHER

## 2023-03-02 RX ORDER — PRAZOSIN HYDROCHLORIDE 1 MG/1
1 CAPSULE ORAL 2 TIMES DAILY
COMMUNITY

## 2023-03-02 NOTE — PROGRESS NOTES
"Chief Complaint   Patient presents with   • Hospital follow up     Patient was seen at [facility] for chest pain last month.   • Chest Pain     Doing better since hospital stay. He is scheduled for consult for sleep study in [place] soon.   • Med Refill     Needs refills on cardiac medications-90 day. Patient went over medications verbally.   • Stress test     He reports collapsing during stress test, unable to determine cause. He does not feel related to medication due to having stress in past with no problems.     Subjective       Myron Lackey is a 52 y.o. 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 He follows with , managed with CellCept. In January 2018, he had stenting of 95% L PDA, and PTCA of LCX. Later in 2018, found to have patent stent with 65% RCA, acceptable FFR.  In 2019, went to  with seizure activity, possibly related to ETOH withdrawal, but all workup negative. Initally treated with Keppra, later discontinued due to psychosis. He then was treated for LUPUS flare. On 11/6/20 he was seen at  for \"staring\" spells.  It was unsure if they were actually seizures.  Apparently he experienced chest pain. After discharge, stress test repeated on 11/20/2020 revealing small inferoseptal wall infarct with ranjeet-infarct ischemia similar to previous stress test (prior to RCA stent). Nitro patch started. At May 2021 visit he admitted to persistent symptoms and elective cath advised. On 5/27/21, cath revealed stenosis of LAD and RCA with acceptable FFR. L-arginine was added. He then developed chest pain after, but admitted he stopped taking meds, once he restarted them, he felt much better. Repeat stress on 8/30/22 showed inferoseptal changes secondary to GI artifact. No ischemia, home meds continued.     He then presented to [facility] on 2/16/23 with chest discomfort. CTA chest ruled out aortitis with hx " of lupus. Lexiscan showed no ischemia, GI artifact. He had syncope relieved with IV aminophylline.  Echo 2/17/23 EF 60%, mild MR, RVSP 29 mmHg. He was hyponatremic on admission at 125. , Tri 191, HDL 39, LDL 99.        Cardiac History:    Past Surgical History:   Procedure Laterality Date   • CARDIAC CATHETERIZATION  01/29/2018    95% (L) PDA- 2.5x22 Reolute. PTCA of LCX   • CARDIAC CATHETERIZATION  10/17/2018    Patent stent, 65% RCA with FFR .85. medcial mgmt   • CARDIAC CATHETERIZATION  07/08/2020    99% Distal RCA- 2.75x26 Resolute   • CARDIAC CATHETERIZATION  05/27/2021    65% LAD .FFR 0.89. 65% RCA- FFR 0.99   • CARDIOVASCULAR STRESS TEST  12/12/2017    L. Myoview- R/O Inferior Ischemia   • CARDIOVASCULAR STRESS TEST  10/10/2018    L. Cardiolite- EF 52%. Inferior Infarct with Periinfarct Ischemia.   • CARDIOVASCULAR STRESS TEST  01/09/2020    L. Cardiolite- EF 45%. Inferoseptal Infarct with periinfarct Ischemia.   • CARDIOVASCULAR STRESS TEST  11/19/2020    Lexiscan- EF 58%. Inferoseptal Infarct with PeriInfarct Ischemia.   • CARDIOVASCULAR STRESS TEST  08/30/2022    Lexiscan- EF 52%. GI Artifacts   • CARDIOVASCULAR STRESS TEST  02/17/2023    ([facility]) Lexiscan- no ischemia, GI artifact, syncope relieved with IV amionophylline   • ECHO - CONVERTED  12/12/2017    EF 65%   • ECHO - CONVERTED  07/08/2020    @ [facility]. EF 60%. Mild MR   • ECHO - CONVERTED  02/17/2023    ([facility]) EF 60%, mild MR, RVSP 29 mmHg   • OTHER SURGICAL HISTORY  02/16/2023    CTA no aortitis, no PE     Current Outpatient Medications   Medication Sig Dispense Refill   • ALPRAZolam (XANAX) 2 MG tablet Take 1 tablet by mouth 3 (Three) Times a Day As Needed for Anxiety.     • amLODIPine (NORVASC) 10 MG tablet TAKE 1 TABLET BY MOUTH DAILY. FOR BLOOD PRESSURE 90 tablet 3   • ARGININE PO Take 1,000 mg by mouth 2 (Two) Times a Day.     • carvedilol (COREG) 25 MG tablet Take 1 tablet by mouth 2 (Two) Times a Day. 180 tablet 3   • fenofibrate  micronized (LOFIBRA) 67 MG capsule Take 1 capsule by mouth Every Morning Before Breakfast. 90 capsule 3   • hydroxychloroquine (PLAQUENIL) 200 MG tablet Take 1 tablet by mouth 2 (Two) Times a Day.     • losartan-hydrochlorothiazide (HYZAAR) 100-25 MG per tablet TAKE 1 TABLET BY MOUTH DAILY. 90 tablet 0   • mycophenolate (CELLCEPT) 200 MG/ML suspension Take 5 mL by mouth. 1000 mg in Am   1500 in PM     • nitroglycerin (NITRODUR) 0.4 MG/HR patch Place 1 patch on the skin as directed by provider See Admin Instructions. Apply patch daily, remove at night for at least 10 hours (Patient taking differently: Place 1 patch on the skin as directed by provider See Admin Instructions. Daily as needed) 30 patch 11   • omeprazole (priLOSEC) 20 MG capsule Take 1 capsule by mouth Daily.     • OXcarbazepine (TRILEPTAL) 300 MG tablet Take 3 tablets by mouth 2 (Two) Times a Day.     • prazosin (MINIPRESS) 1 MG capsule Take 1 capsule by mouth 2 (Two) Times a Day.     • rosuvastatin (CRESTOR) 40 MG tablet Take 1 tablet by mouth Daily.     • sertraline (ZOLOFT) 50 MG tablet Take 1 tablet by mouth Daily.     • ticagrelor (Brilinta) 90 MG tablet tablet Take 1 tablet by mouth 2 (Two) Times a Day. 180 tablet 3   • vitamin D (ERGOCALCIFEROL) 1.25 MG (30419 UT) capsule capsule Take 1 capsule by mouth Every 30 (Thirty) Days.       No current facility-administered medications for this visit.     Imdur [isosorbide nitrate], Keppra [levetiracetam], and Lisinopril    Past Medical History:   Diagnosis Date   • Adjustment disorder    • Anxiety    • Cerebral vasculitis    • Depression    • Epilepsy (HCC)    • History of surgery on arm     both arms   • Hyperlipidemia    • Hypertension    • PTSD (post-traumatic stress disorder)    • Stroke (HCC) 2014   • Systemic lupus (HCC)      Social History     Socioeconomic History   • Marital status:    Tobacco Use   • Smoking status: Never   • Smokeless tobacco: Never   Vaping Use   • Vaping Use: Never  used   Substance and Sexual Activity   • Alcohol use: Not Currently     Comment: occasional   • Drug use: No   • Sexual activity: Defer     Family History   Problem Relation Age of Onset   • Hypertension Mother    • Hyperlipidemia Mother    • Lung disease Mother    • Heart attack Mother    • Heart disease Mother    • Peripheral vascular disease Mother    • Heart attack Father    • Heart disease Father    • Hypertension Father    • Hyperlipidemia Father    • Heart attack Maternal Uncle    • Heart disease Maternal Uncle    • Hypertension Maternal Uncle    • Hyperlipidemia Maternal Uncle    • Heart attack Paternal Uncle    • Heart disease Paternal Uncle    • Hypertension Paternal Uncle    • Hyperlipidemia Paternal Uncle    • Heart disease Maternal Grandmother    • Heart attack Maternal Grandmother    • Hypertension Maternal Grandmother    • Hyperlipidemia Maternal Grandmother    • Heart attack Maternal Grandfather    • Heart disease Maternal Grandfather    • Hypertension Maternal Grandfather    • Hyperlipidemia Maternal Grandfather    • Heart attack Paternal Grandmother    • Heart disease Paternal Grandmother    • Hypertension Paternal Grandmother    • Hyperlipidemia Paternal Grandmother    • Heart attack Paternal Grandfather    • Heart disease Paternal Grandfather    • Hypertension Paternal Grandfather    • Hyperlipidemia Paternal Grandfather      Review of Systems   Constitutional: Positive for weight gain (+7). Negative for decreased appetite and malaise/fatigue.   HENT: Negative.    Eyes: Negative for blurred vision.   Cardiovascular: Positive for chest pain. Negative for dyspnea on exertion, leg swelling, palpitations and syncope.   Respiratory: Negative for shortness of breath and sleep disturbances due to breathing.    Endocrine: Negative.    Hematologic/Lymphatic: Negative for bleeding problem. Does not bruise/bleed easily.   Skin: Negative.    Musculoskeletal: Negative for falls and myalgias.  "  Gastrointestinal: Negative for abdominal pain, heartburn (qestionable ) and melena.   Genitourinary: Negative for hematuria.   Neurological: Negative for dizziness and light-headedness.   Psychiatric/Behavioral: Negative for altered mental status.   Allergic/Immunologic: Negative.         Objective     /68 (BP Location: Right arm)   Pulse 60   Ht 167.6 cm (65.98\")   Wt 88.6 kg (195 lb 6.4 oz)   BMI 31.55 kg/m²     Vitals and nursing note reviewed.   Constitutional:       General: Not in acute distress.     Appearance: Well-developed. Not diaphoretic.   Eyes:      Pupils: Pupils are equal, round, and reactive to light.   HENT:      Head: Normocephalic.   Pulmonary:      Effort: Pulmonary effort is normal. No respiratory distress.      Breath sounds: Normal breath sounds.   Cardiovascular:      Normal rate. Regular rhythm.   Pulses:     Intact distal pulses.   Abdominal:      General: Bowel sounds are normal.      Palpations: Abdomen is soft.   Musculoskeletal: Normal range of motion.      Cervical back: Normal range of motion. Skin:     General: Skin is warm and dry.   Neurological:      Mental Status: Alert and oriented to person, place, and time.        Procedures          Problem List Items Addressed This Visit        Cardiac and Vasculature    Essential hypertension    Relevant Medications    prazosin (MINIPRESS) 1 MG capsule    Hypercholesteremia    Relevant Medications    rosuvastatin (CRESTOR) 40 MG tablet    Coronary artery disease involving native coronary artery of native heart without angina pectoris - Primary       Gastrointestinal Abdominal     Heart burn       Neuro    Cerebral vasculitis       Pulmonary and Pneumonias    Shortness of breath       Symptoms and Signs    Chest pressure      1. CAD- s/p stenting in 2018, 2020. Acceptable FFR of 65% LAD and 65% RCA. Repeat stress in 8/2022 and again during hospitalization 2/17/23 showed no ischemia but significant GI artifact. Continue medical " management with antianginals, antiplatelets. Recommend GI evaluation since he has never undergone screening colonoscopy or upper endoscopy.     2. HTN- well controlled, continue carvedilol, Hyzaar, amlodipine. Do not over-restrict Na. If hyponatremia persists, may need to stop HCTZ.     3. Hypercholesterolemia- LDL 99 on Lipitor. Changed to Crestor 40 mg. Continue fenofibrate.     4.  Lupus with vasculitis. On Cellcept.     FU 6 months. Hospital records reviewed.     BMI is >= 30 and <35. (Class 1 Obesity). The following options were offered after discussion;: nutrition counseling/recommendations               Electronically signed by CHON Ansari,  March 5, 2023 20:47 EST

## 2023-03-05 RX ORDER — ROSUVASTATIN CALCIUM 40 MG/1
40 TABLET, COATED ORAL DAILY
Qty: 90 TABLET | Refills: 3 | Status: SHIPPED | OUTPATIENT
Start: 2023-03-05

## 2023-03-05 RX ORDER — LOSARTAN POTASSIUM AND HYDROCHLOROTHIAZIDE 25; 100 MG/1; MG/1
1 TABLET ORAL DAILY
Qty: 90 TABLET | Refills: 3 | Status: SHIPPED | OUTPATIENT
Start: 2023-03-05

## 2023-03-05 RX ORDER — FENOFIBRATE 67 MG/1
67 CAPSULE ORAL
Qty: 90 CAPSULE | Refills: 3 | Status: SHIPPED | OUTPATIENT
Start: 2023-03-05

## 2023-03-05 RX ORDER — AMLODIPINE BESYLATE 10 MG/1
10 TABLET ORAL DAILY
Qty: 90 TABLET | Refills: 3 | Status: SHIPPED | OUTPATIENT
Start: 2023-03-05

## 2023-03-05 RX ORDER — CARVEDILOL 25 MG/1
25 TABLET ORAL 2 TIMES DAILY
Qty: 180 TABLET | Refills: 3 | Status: SHIPPED | OUTPATIENT
Start: 2023-03-05

## 2023-03-05 RX ORDER — NITROGLYCERIN 80 MG/1
1 PATCH TRANSDERMAL SEE ADMIN INSTRUCTIONS
Qty: 90 PATCH | Refills: 3 | Status: SHIPPED | OUTPATIENT
Start: 2023-03-05

## 2023-06-19 ENCOUNTER — TELEPHONE (OUTPATIENT)
Dept: CARDIOLOGY | Facility: CLINIC | Age: 53
End: 2023-06-19

## 2023-06-19 NOTE — TELEPHONE ENCOUNTER
"  Caller: Myron Lackey \"KAYLEY\"    Relationship: Self    Best call back number: [phone]     What is the best time to reach you: ANY    Who are you requesting to speak with (clinical staff, provider,  specific staff member): FRONT STAFF    Do you know the name of the person who called:     What was the call regarding: PT NEEDS A LETTER FOR DISABILITY STATING WHAT HE IS BEEN TREATED FOR CARDIOLOGY WISE. PT CAN  LETTER     Is it okay if the provider responds through MyChart: NO    "

## 2023-06-22 NOTE — TELEPHONE ENCOUNTER
I spoke with patient.  He requests letter be mailed to his home address.  Patient aware I will type and mail to him.

## 2023-08-04 RX ORDER — ERGOCALCIFEROL 1.25 MG/1
CAPSULE ORAL
Qty: 1 CAPSULE | Refills: 4 | Status: SHIPPED | OUTPATIENT
Start: 2023-08-04

## 2023-09-13 ENCOUNTER — TELEPHONE (OUTPATIENT)
Dept: CARDIOLOGY | Facility: CLINIC | Age: 53
End: 2023-09-13
Payer: MEDICARE

## 2023-09-13 DIAGNOSIS — I25.10 CORONARY ARTERY DISEASE INVOLVING NATIVE CORONARY ARTERY OF NATIVE HEART WITHOUT ANGINA PECTORIS: Primary | ICD-10-CM

## 2023-09-13 DIAGNOSIS — E55.9 VITAMIN D DEFICIENCY: ICD-10-CM

## 2023-09-13 DIAGNOSIS — I10 ESSENTIAL HYPERTENSION: ICD-10-CM

## 2023-09-13 DIAGNOSIS — R73.9 HYPERGLYCEMIA: ICD-10-CM

## 2023-09-13 DIAGNOSIS — E78.00 HYPERCHOLESTEREMIA: ICD-10-CM

## 2023-09-13 DIAGNOSIS — R06.02 SHORTNESS OF BREATH: ICD-10-CM

## 2023-09-13 NOTE — TELEPHONE ENCOUNTER
"    Caller: Myron Lackey \"KAYLEY\"    Relationship: Self    Best call back number: [phone]    What orders are you requesting (i.e. lab or imaging): LABS    In what timeframe would the patient need to come in: BEFORE 9.18    Where will you receive your lab/imaging services: [facility]     Additional notes: PATIENT ASKING IF LABS ARE NEEDED BEFORE THE 9.18 APPOINTMENT. CALL HIM IF SO        "

## 2023-09-13 NOTE — TELEPHONE ENCOUNTER
No answer, I left VM to let patient know lab orders have been placed to be done at our diagnostic center and to be fasting.

## 2023-09-18 ENCOUNTER — OFFICE VISIT (OUTPATIENT)
Dept: CARDIOLOGY | Facility: CLINIC | Age: 53
End: 2023-09-18
Payer: MEDICARE

## 2023-09-18 VITALS
SYSTOLIC BLOOD PRESSURE: 120 MMHG | DIASTOLIC BLOOD PRESSURE: 62 MMHG | WEIGHT: 185 LBS | HEIGHT: 66 IN | BODY MASS INDEX: 29.73 KG/M2 | HEART RATE: 60 BPM

## 2023-09-18 DIAGNOSIS — I25.10 CORONARY ARTERY DISEASE INVOLVING NATIVE CORONARY ARTERY OF NATIVE HEART WITHOUT ANGINA PECTORIS: ICD-10-CM

## 2023-09-18 DIAGNOSIS — E78.1 HYPERTRIGLYCERIDEMIA: ICD-10-CM

## 2023-09-18 DIAGNOSIS — E78.00 HYPERCHOLESTEREMIA: Primary | ICD-10-CM

## 2023-09-18 DIAGNOSIS — R12 HEART BURN: ICD-10-CM

## 2023-09-18 DIAGNOSIS — I67.7 CEREBRAL VASCULITIS: ICD-10-CM

## 2023-09-18 DIAGNOSIS — I10 ESSENTIAL HYPERTENSION: ICD-10-CM

## 2023-09-18 PROCEDURE — 3074F SYST BP LT 130 MM HG: CPT | Performed by: NURSE PRACTITIONER

## 2023-09-18 PROCEDURE — 99214 OFFICE O/P EST MOD 30 MIN: CPT | Performed by: NURSE PRACTITIONER

## 2023-09-18 PROCEDURE — 3078F DIAST BP <80 MM HG: CPT | Performed by: NURSE PRACTITIONER

## 2023-09-18 PROCEDURE — 1159F MED LIST DOCD IN RCRD: CPT | Performed by: NURSE PRACTITIONER

## 2023-09-18 PROCEDURE — 1160F RVW MEDS BY RX/DR IN RCRD: CPT | Performed by: NURSE PRACTITIONER

## 2023-09-18 RX ORDER — FENOFIBRATE 67 MG/1
67 CAPSULE ORAL
Qty: 90 CAPSULE | Refills: 3 | Status: SHIPPED | OUTPATIENT
Start: 2023-09-18

## 2023-09-18 RX ORDER — ROSUVASTATIN CALCIUM 40 MG/1
40 TABLET, COATED ORAL DAILY
Qty: 90 TABLET | Refills: 3 | Status: SHIPPED | OUTPATIENT
Start: 2023-09-18

## 2023-09-18 RX ORDER — CARVEDILOL 25 MG/1
25 TABLET ORAL 2 TIMES DAILY
Qty: 180 TABLET | Refills: 3 | Status: SHIPPED | OUTPATIENT
Start: 2023-09-18

## 2023-09-18 RX ORDER — LOSARTAN POTASSIUM AND HYDROCHLOROTHIAZIDE 25; 100 MG/1; MG/1
1 TABLET ORAL DAILY
Qty: 90 TABLET | Refills: 3 | Status: SHIPPED | OUTPATIENT
Start: 2023-09-18

## 2023-09-18 RX ORDER — MYCOPHENOLATE MOFETIL 500 MG/1
500 TABLET ORAL
COMMUNITY

## 2023-09-18 RX ORDER — AMLODIPINE BESYLATE 10 MG/1
10 TABLET ORAL DAILY
Qty: 90 TABLET | Refills: 3 | Status: SHIPPED | OUTPATIENT
Start: 2023-09-18

## 2023-09-18 NOTE — LETTER
"September 19, 2023       No Recipients    Patient: Myron Lackey   YOB: 1970   Date of Visit: 9/18/2023       Dear CHON Colón    Myron Lackey was in my office today. Below is a copy of my note.    If you have questions, please do not hesitate to call me. I look forward to following Myron along with you.         Sincerely,        CHON Ansari        CC:   No Recipients    Chief Complaint   Patient presents with   • Follow-up     Cardiac management   • Lab     He did not complete labs ordered.   • Med Refill     Needs refills on cardiac medications-90 day.     Subjective      Myron Lackey is a 52 y.o. 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 He follows with , managed with CellCept. In January 2018, he had stenting of 95% L PDA, and PTCA of LCX. Later in 2018, found to have patent stent with 65% RCA, acceptable FFR.  In 2019, went to  with seizure activity, possibly related to ETOH withdrawal, but all workup negative. Initally treated with Keppra, later discontinued due to psychosis. He then was treated for LUPUS flare. On 11/6/20 he was seen at  for \"staring\" spells.  It was unsure if they were actually seizures.  Apparently he experienced chest pain. After discharge, stress test repeated on 11/20/2020 revealing small inferoseptal wall infarct with ranjeet-infarct ischemia similar to previous stress test (prior to RCA stent). Nitro patch started. At May 2021 visit he admitted to persistent symptoms and elective cath advised. On 5/27/21, cath revealed stenosis of LAD and RCA with acceptable FFR. L-arginine was added. He then developed chest pain after, but admitted he stopped taking meds, once he restarted them, he felt much better. Repeat stress on 8/30/22 showed inferoseptal changes secondary to GI artifact. No ischemia, home meds continued.  He then presented to [facility] on " "2/16/23 with chest discomfort. CTA chest ruled out aortitis with hx of lupus. Lexiscan showed no ischemia, GI artifact. He had syncope relieved with IV aminophylline.  Echo 2/17/23 EF 60%, mild MR, RVSP 29 mmHg. He was hyponatremic on admission at 125. , Tri 191, HDL 39, LDL 99. Brilinta 90 mg continued as he had readmission with [facility] after reducing to maintenance dose.     He returns today for follow up with his wife. He denies recurrent chest pain or dyspnea. He has had a\"good\" summer. Has traveled to [place] four times this year. His son is getting  soon. BP well controlled. Tolerating medications including high intensity statin. No recent labs.          Cardiac History:    Past Surgical History:   Procedure Laterality Date   • CARDIAC CATHETERIZATION  01/29/2018    95% (L) PDA- 2.5x22 Reolute. PTCA of LCX   • CARDIAC CATHETERIZATION  10/17/2018    Patent stent, 65% RCA with FFR .85. medcial mgmt   • CARDIAC CATHETERIZATION  07/08/2020    99% Distal RCA- 2.75x26 Resolute   • CARDIAC CATHETERIZATION  05/27/2021    65% LAD .FFR 0.89. 65% RCA- FFR 0.99   • CARDIOVASCULAR STRESS TEST  12/12/2017    L. Myoview- R/O Inferior Ischemia   • CARDIOVASCULAR STRESS TEST  10/10/2018    L. Cardiolite- EF 52%. Inferior Infarct with Periinfarct Ischemia.   • CARDIOVASCULAR STRESS TEST  01/09/2020    L. Cardiolite- EF 45%. Inferoseptal Infarct with periinfarct Ischemia.   • CARDIOVASCULAR STRESS TEST  11/19/2020    Lexiscan- EF 58%. Inferoseptal Infarct with PeriInfarct Ischemia.   • CARDIOVASCULAR STRESS TEST  08/30/2022    Lexiscan- EF 52%. GI Artifacts   • CARDIOVASCULAR STRESS TEST  02/17/2023    ([facility]) Lexiscan- no ischemia, GI artifact, syncope relieved with IV amionophylline   • ECHO - CONVERTED  12/12/2017    EF 65%   • ECHO - CONVERTED  07/08/2020    @ [facility]. EF 60%. Mild MR   • ECHO - CONVERTED  02/17/2023    ([facility]) EF 60%, mild MR, RVSP 29 mmHg   • OTHER SURGICAL HISTORY  02/16/2023    CTA no aortitis, no PE "     Current Outpatient Medications   Medication Sig Dispense Refill   • ALPRAZolam (XANAX) 2 MG tablet Take 1 tablet by mouth 3 (Three) Times a Day As Needed for Anxiety.     • amLODIPine (NORVASC) 10 MG tablet Take 1 tablet by mouth Daily. for blood pressure 90 tablet 3   • carvedilol (COREG) 25 MG tablet Take 1 tablet by mouth 2 (Two) Times a Day. 180 tablet 3   • fenofibrate micronized (LOFIBRA) 67 MG capsule Take 1 capsule by mouth Every Morning Before Breakfast. 90 capsule 3   • hydroxychloroquine (PLAQUENIL) 200 MG tablet Take 1 tablet by mouth 2 (Two) Times a Day.     • losartan-hydrochlorothiazide (HYZAAR) 100-25 MG per tablet Take 1 tablet by mouth Daily. 90 tablet 3   • mycophenolate (CELLCEPT) 500 MG tablet Take 1 tablet by mouth 5 (Five) Times a Day.     • nitroglycerin (NITRODUR) 0.4 MG/HR patch Place 1 patch on the skin as directed by provider See Admin Instructions. Daily as needed (Patient taking differently: Place 1 patch on the skin as directed by provider Daily As Needed.) 90 patch 3   • omeprazole (priLOSEC) 20 MG capsule Take 1 capsule by mouth Daily.     • OXcarbazepine (TRILEPTAL) 300 MG tablet Take 3 tablets by mouth 2 (Two) Times a Day.     • prazosin (MINIPRESS) 1 MG capsule Take 1 capsule by mouth 2 (Two) Times a Day.     • rosuvastatin (CRESTOR) 40 MG tablet Take 1 tablet by mouth Daily. 90 tablet 3   • ticagrelor (Brilinta) 90 MG tablet tablet Take 1 tablet by mouth 2 (Two) Times a Day. 180 tablet 3   • vitamin D (ERGOCALCIFEROL) 1.25 MG (17971 UT) capsule capsule TAKE ONCE A MONTH FOR VITAMIN D REPLACEMENT 1 capsule 4     No current facility-administered medications for this visit.     Imdur [isosorbide nitrate], Keppra [levetiracetam], Lisinopril, and Zoloft [sertraline]    Past Medical History:   Diagnosis Date   • Adjustment disorder    • Anxiety    • Cerebral vasculitis    • COPD (chronic obstructive pulmonary disease)    • Depression    • Epilepsy    • History of surgery on arm      both arms   • Hyperlipidemia    • Hypertension    • PTSD (post-traumatic stress disorder)    • Stroke 2014   • Systemic lupus      Social History     Socioeconomic History   • Marital status:    Tobacco Use   • Smoking status: Never   • Smokeless tobacco: Never   Vaping Use   • Vaping Use: Never used   Substance and Sexual Activity   • Alcohol use: Yes     Comment: occasional   • Drug use: No   • Sexual activity: Defer     Family History   Problem Relation Age of Onset   • Hypertension Mother    • Hyperlipidemia Mother    • Lung disease Mother    • Heart attack Mother    • Heart disease Mother    • Peripheral vascular disease Mother    • Heart attack Father    • Heart disease Father    • Hypertension Father    • Hyperlipidemia Father    • Heart attack Maternal Uncle    • Heart disease Maternal Uncle    • Hypertension Maternal Uncle    • Hyperlipidemia Maternal Uncle    • Heart attack Paternal Uncle    • Heart disease Paternal Uncle    • Hypertension Paternal Uncle    • Hyperlipidemia Paternal Uncle    • Heart disease Maternal Grandmother    • Heart attack Maternal Grandmother    • Hypertension Maternal Grandmother    • Hyperlipidemia Maternal Grandmother    • Heart attack Maternal Grandfather    • Heart disease Maternal Grandfather    • Hypertension Maternal Grandfather    • Hyperlipidemia Maternal Grandfather    • Heart attack Paternal Grandmother    • Heart disease Paternal Grandmother    • Hypertension Paternal Grandmother    • Hyperlipidemia Paternal Grandmother    • Heart attack Paternal Grandfather    • Heart disease Paternal Grandfather    • Hypertension Paternal Grandfather    • Hyperlipidemia Paternal Grandfather      Review of Systems   Constitutional: Positive for weight loss (-10). Negative for decreased appetite and malaise/fatigue.   HENT: Negative.     Eyes:  Negative for blurred vision.   Cardiovascular:  Negative for chest pain, dyspnea on exertion, leg swelling, palpitations and syncope.  "  Respiratory:  Negative for shortness of breath and sleep disturbances due to breathing.    Endocrine: Negative.    Hematologic/Lymphatic: Negative for bleeding problem. Does not bruise/bleed easily.   Skin: Negative.    Musculoskeletal:  Negative for falls and myalgias.   Gastrointestinal:  Negative for abdominal pain, heartburn and melena.   Genitourinary:  Negative for hematuria.   Neurological:  Negative for dizziness and light-headedness.   Psychiatric/Behavioral:  Negative for altered mental status.    Allergic/Immunologic: Negative.       Objective    /62 (BP Location: Left arm)   Pulse 60   Ht 167.6 cm (65.98\")   Wt 83.9 kg (185 lb)   BMI 29.87 kg/m²     Vitals and nursing note reviewed.   Constitutional:       General: Not in acute distress.     Appearance: Well-developed. Not diaphoretic.   Eyes:      Pupils: Pupils are equal, round, and reactive to light.   HENT:      Head: Normocephalic.   Pulmonary:      Effort: Pulmonary effort is normal. No respiratory distress.      Breath sounds: Normal breath sounds.   Cardiovascular:      Normal rate. Regular rhythm.   Pulses:     Intact distal pulses.   Edema:     Peripheral edema absent.   Abdominal:      General: Bowel sounds are normal.      Palpations: Abdomen is soft.   Musculoskeletal: Normal range of motion.      Cervical back: Normal range of motion. Skin:     General: Skin is warm and dry.   Neurological:      Mental Status: Alert and oriented to person, place, and time.        Procedures          Problem List Items Addressed This Visit          Cardiac and Vasculature    Essential hypertension    Relevant Medications    carvedilol (COREG) 25 MG tablet    losartan-hydrochlorothiazide (HYZAAR) 100-25 MG per tablet    amLODIPine (NORVASC) 10 MG tablet    Hypercholesteremia - Primary    Relevant Medications    rosuvastatin (CRESTOR) 40 MG tablet    fenofibrate micronized (LOFIBRA) 67 MG capsule    Coronary artery disease involving native coronary " artery of native heart without angina pectoris    Relevant Medications    carvedilol (COREG) 25 MG tablet    amLODIPine (NORVASC) 10 MG tablet    ticagrelor (Brilinta) 90 MG tablet tablet       Gastrointestinal Abdominal     Heart burn       Neuro    Cerebral vasculitis     Other Visit Diagnoses       Hypertriglyceridemia        Relevant Medications    rosuvastatin (CRESTOR) 40 MG tablet    fenofibrate micronized (LOFIBRA) 67 MG capsule           CAD  -s/p stenting in 2018, 2020. Acceptable FFR of 65% LAD and 65% RCA  -Repeat stress in 8/2022 and again during hospitalization 2/17/23 showed no ischemia but significant GI artifact.  -Continue medical management with antianginals, nitro patch, antiplatelets, Brilinta  -Again, recommend GI eval, needs screening colonoscopy and EGD      HTN  -well controlled, continue carvedilol, Hyzaar, amlodipine, prazosin  -repeat CMP for Na      Hypercholesterolemia  -LDL 99 on Lipitor, changed to Crestor 40 mg. Continue fenofibrate.   -repeat fasting lipid, CMP    Lupus  -with vasculitis, follows with rheumatology, on Cellcept.      FU 6 months or sooner if he develops any new symptoms. Refills sent. No changes made.               Electronically signed by CHON Ansari,  September 19, 2023 10:30 EDT

## 2023-09-18 NOTE — PROGRESS NOTES
"Chief Complaint   Patient presents with    Follow-up     Cardiac management    Lab     He did not complete labs ordered.    Med Refill     Needs refills on cardiac medications-90 day.     Subjective       Myron Lackey is a 52 y.o. 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 He follows with , managed with CellCept. In January 2018, he had stenting of 95% L PDA, and PTCA of LCX. Later in 2018, found to have patent stent with 65% RCA, acceptable FFR.  In 2019, went to  with seizure activity, possibly related to ETOH withdrawal, but all workup negative. Initally treated with Keppra, later discontinued due to psychosis. He then was treated for LUPUS flare. On 11/6/20 he was seen at  for \"staring\" spells.  It was unsure if they were actually seizures.  Apparently he experienced chest pain. After discharge, stress test repeated on 11/20/2020 revealing small inferoseptal wall infarct with ranjeet-infarct ischemia similar to previous stress test (prior to RCA stent). Nitro patch started. At May 2021 visit he admitted to persistent symptoms and elective cath advised. On 5/27/21, cath revealed stenosis of LAD and RCA with acceptable FFR. L-arginine was added. He then developed chest pain after, but admitted he stopped taking meds, once he restarted them, he felt much better. Repeat stress on 8/30/22 showed inferoseptal changes secondary to GI artifact. No ischemia, home meds continued.  He then presented to [facility] on 2/16/23 with chest discomfort. CTA chest ruled out aortitis with hx of lupus. Lexiscan showed no ischemia, GI artifact. He had syncope relieved with IV aminophylline.  Echo 2/17/23 EF 60%, mild MR, RVSP 29 mmHg. He was hyponatremic on admission at 125. , Tri 191, HDL 39, LDL 99. Brilinta 90 mg continued as he had readmission with CP after reducing to maintenance dose.     He returns today for follow up " "with his wife. He denies recurrent chest pain or dyspnea. He has had a\"good\" summer. Has traveled to [place] four times this year. His son is getting  soon. BP well controlled. Tolerating medications including high intensity statin. No recent labs.          Cardiac History:    Past Surgical History:   Procedure Laterality Date    CARDIAC CATHETERIZATION  01/29/2018    95% (L) PDA- 2.5x22 Reolute. PTCA of LCX    CARDIAC CATHETERIZATION  10/17/2018    Patent stent, 65% RCA with FFR .85. medcial mgmt    CARDIAC CATHETERIZATION  07/08/2020    99% Distal RCA- 2.75x26 Resolute    CARDIAC CATHETERIZATION  05/27/2021    65% LAD .FFR 0.89. 65% RCA- FFR 0.99    CARDIOVASCULAR STRESS TEST  12/12/2017    L. Myoview- R/O Inferior Ischemia    CARDIOVASCULAR STRESS TEST  10/10/2018    L. Cardiolite- EF 52%. Inferior Infarct with Periinfarct Ischemia.    CARDIOVASCULAR STRESS TEST  01/09/2020    L. Cardiolite- EF 45%. Inferoseptal Infarct with periinfarct Ischemia.    CARDIOVASCULAR STRESS TEST  11/19/2020    Lexiscan- EF 58%. Inferoseptal Infarct with PeriInfarct Ischemia.    CARDIOVASCULAR STRESS TEST  08/30/2022    Lexiscan- EF 52%. GI Artifacts    CARDIOVASCULAR STRESS TEST  02/17/2023    [place] [place] Lexiscan- no ischemia, GI artifact, syncope relieved with IV amionophylline    ECHO - CONVERTED  12/12/2017    EF 65%    ECHO - CONVERTED  07/08/2020    @ [facility] [place]. EF 60%. Mild MR    ECHO - CONVERTED  02/17/2023    ([facility] [place]) EF 60%, mild MR, RVSP 29 mmHg    OTHER SURGICAL HISTORY  02/16/2023    CTA no aortitis, no PE     Current Outpatient Medications   Medication Sig Dispense Refill    ALPRAZolam (XANAX) 2 MG tablet Take 1 tablet by mouth 3 (Three) Times a Day As Needed for Anxiety.      amLODIPine (NORVASC) 10 MG tablet Take 1 tablet by mouth Daily. for blood pressure 90 tablet 3    carvedilol (COREG) 25 MG tablet Take 1 tablet by mouth 2 (Two) Times a Day. 180 tablet 3    fenofibrate micronized (LOFIBRA) 67 MG capsule Take 1 capsule by " mouth Every Morning Before Breakfast. 90 capsule 3    hydroxychloroquine (PLAQUENIL) 200 MG tablet Take 1 tablet by mouth 2 (Two) Times a Day.      losartan-hydrochlorothiazide (HYZAAR) 100-25 MG per tablet Take 1 tablet by mouth Daily. 90 tablet 3    mycophenolate (CELLCEPT) 500 MG tablet Take 1 tablet by mouth 5 (Five) Times a Day.      nitroglycerin (NITRODUR) 0.4 MG/HR patch Place 1 patch on the skin as directed by provider See Admin Instructions. Daily as needed (Patient taking differently: Place 1 patch on the skin as directed by provider Daily As Needed.) 90 patch 3    omeprazole (priLOSEC) 20 MG capsule Take 1 capsule by mouth Daily.      OXcarbazepine (TRILEPTAL) 300 MG tablet Take 3 tablets by mouth 2 (Two) Times a Day.      prazosin (MINIPRESS) 1 MG capsule Take 1 capsule by mouth 2 (Two) Times a Day.      rosuvastatin (CRESTOR) 40 MG tablet Take 1 tablet by mouth Daily. 90 tablet 3    ticagrelor (Brilinta) 90 MG tablet tablet Take 1 tablet by mouth 2 (Two) Times a Day. 180 tablet 3    vitamin D (ERGOCALCIFEROL) 1.25 MG (49673 UT) capsule capsule TAKE ONCE A MONTH FOR VITAMIN D REPLACEMENT 1 capsule 4     No current facility-administered medications for this visit.     Imdur [isosorbide nitrate], Keppra [levetiracetam], Lisinopril, and Zoloft [sertraline]    Past Medical History:   Diagnosis Date    Adjustment disorder     Anxiety     Cerebral vasculitis     COPD (chronic obstructive pulmonary disease)     Depression     Epilepsy     History of surgery on arm     both arms    Hyperlipidemia     Hypertension     PTSD (post-traumatic stress disorder)     Stroke 2014    Systemic lupus      Social History     Socioeconomic History    Marital status:    Tobacco Use    Smoking status: Never    Smokeless tobacco: Never   Vaping Use    Vaping Use: Never used   Substance and Sexual Activity    Alcohol use: Yes     Comment: occasional    Drug use: No    Sexual activity: Defer     Family History   Problem  Relation Age of Onset    Hypertension Mother     Hyperlipidemia Mother     Lung disease Mother     Heart attack Mother     Heart disease Mother     Peripheral vascular disease Mother     Heart attack Father     Heart disease Father     Hypertension Father     Hyperlipidemia Father     Heart attack Maternal Uncle     Heart disease Maternal Uncle     Hypertension Maternal Uncle     Hyperlipidemia Maternal Uncle     Heart attack Paternal Uncle     Heart disease Paternal Uncle     Hypertension Paternal Uncle     Hyperlipidemia Paternal Uncle     Heart disease Maternal Grandmother     Heart attack Maternal Grandmother     Hypertension Maternal Grandmother     Hyperlipidemia Maternal Grandmother     Heart attack Maternal Grandfather     Heart disease Maternal Grandfather     Hypertension Maternal Grandfather     Hyperlipidemia Maternal Grandfather     Heart attack Paternal Grandmother     Heart disease Paternal Grandmother     Hypertension Paternal Grandmother     Hyperlipidemia Paternal Grandmother     Heart attack Paternal Grandfather     Heart disease Paternal Grandfather     Hypertension Paternal Grandfather     Hyperlipidemia Paternal Grandfather      Review of Systems   Constitutional: Positive for weight loss (-10). Negative for decreased appetite and malaise/fatigue.   HENT: Negative.     Eyes:  Negative for blurred vision.   Cardiovascular:  Negative for chest pain, dyspnea on exertion, leg swelling, palpitations and syncope.   Respiratory:  Negative for shortness of breath and sleep disturbances due to breathing.    Endocrine: Negative.    Hematologic/Lymphatic: Negative for bleeding problem. Does not bruise/bleed easily.   Skin: Negative.    Musculoskeletal:  Negative for falls and myalgias.   Gastrointestinal:  Negative for abdominal pain, heartburn and melena.   Genitourinary:  Negative for hematuria.   Neurological:  Negative for dizziness and light-headedness.   Psychiatric/Behavioral:  Negative for altered  "mental status.    Allergic/Immunologic: Negative.       Objective     /62 (BP Location: Left arm)   Pulse 60   Ht 167.6 cm (65.98\")   Wt 83.9 kg (185 lb)   BMI 29.87 kg/m²     Vitals and nursing note reviewed.   Constitutional:       General: Not in acute distress.     Appearance: Well-developed. Not diaphoretic.   Eyes:      Pupils: Pupils are equal, round, and reactive to light.   HENT:      Head: Normocephalic.   Pulmonary:      Effort: Pulmonary effort is normal. No respiratory distress.      Breath sounds: Normal breath sounds.   Cardiovascular:      Normal rate. Regular rhythm.   Pulses:     Intact distal pulses.   Edema:     Peripheral edema absent.   Abdominal:      General: Bowel sounds are normal.      Palpations: Abdomen is soft.   Musculoskeletal: Normal range of motion.      Cervical back: Normal range of motion. Skin:     General: Skin is warm and dry.   Neurological:      Mental Status: Alert and oriented to person, place, and time.        Procedures          Problem List Items Addressed This Visit          Cardiac and Vasculature    Essential hypertension    Relevant Medications    carvedilol (COREG) 25 MG tablet    losartan-hydrochlorothiazide (HYZAAR) 100-25 MG per tablet    amLODIPine (NORVASC) 10 MG tablet    Hypercholesteremia - Primary    Relevant Medications    rosuvastatin (CRESTOR) 40 MG tablet    fenofibrate micronized (LOFIBRA) 67 MG capsule    Coronary artery disease involving native coronary artery of native heart without angina pectoris    Relevant Medications    carvedilol (COREG) 25 MG tablet    amLODIPine (NORVASC) 10 MG tablet    ticagrelor (Brilinta) 90 MG tablet tablet       Gastrointestinal Abdominal     Heart burn       Neuro    Cerebral vasculitis     Other Visit Diagnoses       Hypertriglyceridemia        Relevant Medications    rosuvastatin (CRESTOR) 40 MG tablet    fenofibrate micronized (LOFIBRA) 67 MG capsule           CAD  -s/p stenting in 2018, 2020. Acceptable " FFR of 65% LAD and 65% RCA  -Repeat stress in 8/2022 and again during hospitalization 2/17/23 showed no ischemia but significant GI artifact.  -Continue medical management with antianginals, nitro patch, antiplatelets, Brilinta  -Again, recommend GI eval, needs screening colonoscopy and EGD      HTN  -well controlled, continue carvedilol, Hyzaar, amlodipine, prazosin  -repeat CMP for Na      Hypercholesterolemia  -LDL 99 on Lipitor, changed to Crestor 40 mg. Continue fenofibrate.   -repeat fasting lipid, CMP    Lupus  -with vasculitis, follows with rheumatology, on Cellcept.      FU 6 months or sooner if he develops any new symptoms. Refills sent. No changes made.               Electronically signed by CHON Ansari,  September 19, 2023 10:30 EDT

## 2023-09-19 PROBLEM — E78.1 HYPERTRIGLYCERIDEMIA: Status: ACTIVE | Noted: 2023-09-19

## 2023-11-27 RX ORDER — ERGOCALCIFEROL 1.25 MG/1
CAPSULE ORAL
Qty: 3 CAPSULE | Refills: 4 | Status: SHIPPED | OUTPATIENT
Start: 2023-11-27

## 2023-11-28 ENCOUNTER — TELEPHONE (OUTPATIENT)
Dept: CARDIOLOGY | Facility: CLINIC | Age: 53
End: 2023-11-28
Payer: MEDICARE

## 2023-11-28 NOTE — TELEPHONE ENCOUNTER
Called and spoke with patient he was needing appt moved up. I rescheduled him for 01/10/2024 @ 8:15 with Tania GIVENS.

## 2023-11-28 NOTE — TELEPHONE ENCOUNTER
"Caller: Myron Lackey \"KAYLEY\"    Relationship to patient: Self    Best call back number: [phone]     Chief complaint: NEEDS A MED REVIEW     Type of visit: F/U    Requested date: ASAP      If rescheduling, when is the original appointment: 3/21/24     Additional notes: HAS A MEDICAL REVIEW COMING UP THAT HAS TO TURNED IN IN FEB 2024. NEEDS TO BE SEEN SOONER DUE TO THIS. PLEASE ADVISE PT ON NEW DATE/ TIME OF APPT.         "

## 2024-03-11 RX ORDER — TICAGRELOR 90 MG/1
90 TABLET ORAL 2 TIMES DAILY
Qty: 180 TABLET | Refills: 3 | Status: SHIPPED | OUTPATIENT
Start: 2024-03-11

## 2024-03-11 RX ORDER — ROSUVASTATIN CALCIUM 40 MG/1
40 TABLET, COATED ORAL DAILY
Qty: 90 TABLET | Refills: 3 | Status: SHIPPED | OUTPATIENT
Start: 2024-03-11

## 2024-05-01 ENCOUNTER — TELEPHONE (OUTPATIENT)
Dept: CARDIOLOGY | Facility: CLINIC | Age: 54
End: 2024-05-01
Payer: MEDICARE

## 2024-05-01 NOTE — TELEPHONE ENCOUNTER
CALLED PATIENT NO ANSWER NEW APT LEFT ON VOICE MAIL.  THEY ARE TO CALL AND R/S IF THIS DOES NOT WORK.

## 2024-05-01 NOTE — TELEPHONE ENCOUNTER
"Caller: Myron Lackey \"KAYLEY\"    Relationship to patient: Self    Best call back number: [phone]     Chief complaint: WANTS TO BE SCHD AT SAME DAY/ SIMILAR TIME AS PT [phone]     Type of visit: F/U     Requested date: CAN NOT DO 5/20 OR 5/28     NEXT AVAILABLE TOGETHER IS 6/17, WOULD LIKE IT THIS MONTH IF AT ALL POSSIBLE.       "

## 2024-05-20 RX ORDER — LOSARTAN POTASSIUM AND HYDROCHLOROTHIAZIDE 25; 100 MG/1; MG/1
1 TABLET ORAL DAILY
Qty: 60 TABLET | Refills: 3 | OUTPATIENT
Start: 2024-05-20

## 2024-06-27 RX ORDER — NITROGLYCERIN 80 MG/1
1 PATCH TRANSDERMAL DAILY PRN
Qty: 90 PATCH | Refills: 1 | Status: SHIPPED | OUTPATIENT
Start: 2024-06-27

## 2024-08-19 ENCOUNTER — OFFICE VISIT (OUTPATIENT)
Dept: CARDIOLOGY | Facility: CLINIC | Age: 54
End: 2024-08-19
Payer: MEDICARE

## 2024-08-19 VITALS
WEIGHT: 188.6 LBS | BODY MASS INDEX: 30.31 KG/M2 | DIASTOLIC BLOOD PRESSURE: 80 MMHG | HEART RATE: 72 BPM | HEIGHT: 66 IN | SYSTOLIC BLOOD PRESSURE: 120 MMHG

## 2024-08-19 DIAGNOSIS — E78.00 HYPERCHOLESTEREMIA: ICD-10-CM

## 2024-08-19 DIAGNOSIS — I10 ESSENTIAL HYPERTENSION: Primary | ICD-10-CM

## 2024-08-19 DIAGNOSIS — I25.10 CORONARY ARTERY DISEASE INVOLVING NATIVE CORONARY ARTERY OF NATIVE HEART WITHOUT ANGINA PECTORIS: ICD-10-CM

## 2024-08-19 DIAGNOSIS — E78.1 HYPERTRIGLYCERIDEMIA: ICD-10-CM

## 2024-08-19 RX ORDER — ERGOCALCIFEROL 1.25 MG/1
50000 CAPSULE ORAL
Qty: 3 CAPSULE | Refills: 4 | Status: SHIPPED | OUTPATIENT
Start: 2024-08-19

## 2024-08-19 RX ORDER — LOSARTAN POTASSIUM AND HYDROCHLOROTHIAZIDE 25; 100 MG/1; MG/1
1 TABLET ORAL DAILY
Qty: 90 TABLET | Refills: 3 | Status: SHIPPED | OUTPATIENT
Start: 2024-08-19

## 2024-08-19 RX ORDER — FENOFIBRATE 67 MG/1
67 CAPSULE ORAL
Qty: 90 CAPSULE | Refills: 3 | Status: SHIPPED | OUTPATIENT
Start: 2024-08-19

## 2024-08-19 RX ORDER — AMLODIPINE BESYLATE 10 MG/1
10 TABLET ORAL DAILY
Qty: 90 TABLET | Refills: 3 | Status: SHIPPED | OUTPATIENT
Start: 2024-08-19

## 2024-08-19 RX ORDER — CARVEDILOL 25 MG/1
25 TABLET ORAL 2 TIMES DAILY
Qty: 180 TABLET | Refills: 3 | Status: SHIPPED | OUTPATIENT
Start: 2024-08-19

## 2024-08-19 NOTE — PROGRESS NOTES
"Chief Complaint   Patient presents with    Follow-up     Cardiac management    LABS     Lipid panel 09/2023  CBC & CMP  5-, results in chart  Patient    has active order for labs , he will go this week to have labs drawn    Med Refill     Needs refills on amlodipine, Hyzaar Meron D ,Coreg  and fenofibrate 90 day supply to [facility]        HPI:  HPI   Myron Lackey is a 53 y.o. 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 He follows with , managed with CellCept. In January 2018, he had stenting of 95% L PDA, and PTCA of LCX. Later in 2018, found to have patent stent with 65% RCA, acceptable FFR.  In 2019, went to  with seizure activity, possibly related to ETOH withdrawal, but all workup negative. Initally treated with Keppra, later discontinued due to psychosis. He then was treated for LUPUS flare. On 11/6/20 he was seen at  for \"staring\" spells.  It was unsure if they were actually seizures.  Apparently he experienced chest pain. After discharge, stress test repeated on 11/20/2020 revealing small inferoseptal wall infarct with ranjeet-infarct ischemia similar to previous stress test (prior to RCA stent). Nitro patch started. At May 2021 visit he admitted to persistent symptoms and elective cath advised. On 5/27/21, cath revealed stenosis of LAD and RCA with acceptable FFR. L-arginine was added. He then developed chest pain after, but admitted he stopped taking meds, once he restarted them, he felt much better. Repeat stress on 8/30/22 showed inferoseptal changes secondary to GI artifact. No ischemia, home meds continued.  He then presented to [facility] on 2/16/23 with chest discomfort. CTA chest ruled out aortitis with hx of lupus. Lexiscan showed no ischemia, GI artifact. He had syncope relieved with IV aminophylline.  Echo 2/17/23 EF 60%, mild MR, RVSP 29 mmHg. He was hyponatremic on admission at " 125. , Tri 191, HDL 39, LDL 99. Brilinta 90 mg continued as he had readmission with CP after reducing to maintenance dose.      He returns today for follow up with his wife. Patient denies chest pain, pressure, palpitations, tightness, dizziness, shortness of air. He has started on Benlysa injections for lupus and reports doing great with it. They have been living in [place] most of the year and doing well.     Cardiac History:    Past Surgical History:   Procedure Laterality Date    CARDIAC CATHETERIZATION  01/29/2018    95% (L) PDA- 2.5x22 Reolute. PTCA of LCX    CARDIAC CATHETERIZATION  10/17/2018    Patent stent, 65% RCA with FFR .85. medcial mgmt    CARDIAC CATHETERIZATION  07/08/2020    99% Distal RCA- 2.75x26 Resolute    CARDIAC CATHETERIZATION  05/27/2021    65% LAD .FFR 0.89. 65% RCA- FFR 0.99    CARDIOVASCULAR STRESS TEST  12/12/2017    L. Myoview- R/O Inferior Ischemia    CARDIOVASCULAR STRESS TEST  10/10/2018    L. Cardiolite- EF 52%. Inferior Infarct with Periinfarct Ischemia.    CARDIOVASCULAR STRESS TEST  01/09/2020    L. Cardiolite- EF 45%. Inferoseptal Infarct with periinfarct Ischemia.    CARDIOVASCULAR STRESS TEST  11/19/2020    Lexiscan- EF 58%. Inferoseptal Infarct with PeriInfarct Ischemia.    CARDIOVASCULAR STRESS TEST  08/30/2022    Lexiscan- EF 52%. GI Artifacts    CARDIOVASCULAR STRESS TEST  02/17/2023    ([facility]) Lexiscan- no ischemia, GI artifact, syncope relieved with IV amionophylline    ECHO - CONVERTED  12/12/2017    EF 65%    ECHO - CONVERTED  07/08/2020    @ [facility]. EF 60%. Mild MR    ECHO - CONVERTED  02/17/2023    ([facility]) EF 60%, mild MR, RVSP 29 mmHg    OTHER SURGICAL HISTORY  02/16/2023    CTA no aortitis, no PE       Current Outpatient Medications   Medication Sig Dispense Refill    ALPRAZolam (XANAX) 2 MG tablet Take 1 tablet by mouth 3 (Three) Times a Day As Needed for Anxiety.      amLODIPine (NORVASC) 10 MG tablet Take 1 tablet by mouth Daily. for blood pressure 90 tablet 3     Belimumab 200 MG/ML solution auto-injector Inject  under the skin into the appropriate area as directed 1 (One) Time Per Week.      Brilinta 90 MG tablet tablet TAKE ONE TABLET BY MOUTH TWO TIMES A  tablet 3    carvedilol (COREG) 25 MG tablet Take 1 tablet by mouth 2 (Two) Times a Day. 180 tablet 3    fenofibrate micronized (LOFIBRA) 67 MG capsule Take 1 capsule by mouth Every Morning Before Breakfast. 90 capsule 3    hydroxychloroquine (PLAQUENIL) 200 MG tablet Take 1 tablet by mouth 2 (Two) Times a Day.      losartan-hydrochlorothiazide (HYZAAR) 100-25 MG per tablet Take 1 tablet by mouth Daily. 90 tablet 3    mycophenolate (CELLCEPT) 500 MG tablet Take 1 tablet by mouth 5 (Five) Times a Day.      nitroglycerin (NITRODUR) 0.4 MG/HR patch Place 1 patch on the skin as directed by provider Daily As Needed (chest pain). 90 patch 1    omeprazole (priLOSEC) 20 MG capsule Take 1 capsule by mouth Daily.      OXcarbazepine (TRILEPTAL) 300 MG tablet Take 3 tablets by mouth 2 (Two) Times a Day.      prazosin (MINIPRESS) 1 MG capsule Take 1 capsule by mouth 2 (Two) Times a Day.      rosuvastatin (CRESTOR) 40 MG tablet TAKE ONE TABLET BY MOUTH EVERY DAY 90 tablet 3    vitamin D (ERGOCALCIFEROL) 1.25 MG (28981 UT) capsule capsule Take 1 capsule by mouth Every 14 (Fourteen) Days. 3 capsule 4     No current facility-administered medications for this visit.       Imdur [isosorbide nitrate], Keppra [levetiracetam], Lisinopril, and Zoloft [sertraline]    Past Medical History:   Diagnosis Date    Adjustment disorder     Anxiety     Cerebral vasculitis     COPD (chronic obstructive pulmonary disease)     Depression     Epilepsy     History of surgery on arm     both arms    Hyperlipidemia     Hypertension     PTSD (post-traumatic stress disorder)     Stroke 2014    Systemic lupus        Social History     Socioeconomic History    Marital status:    Tobacco Use    Smoking status: Never    Smokeless tobacco: Never   Vaping  "Use    Vaping status: Never Used   Substance and Sexual Activity    Alcohol use: Yes     Comment: occasional    Drug use: No    Sexual activity: Defer       Family History   Problem Relation Age of Onset    Hypertension Mother     Hyperlipidemia Mother     Lung disease Mother     Heart attack Mother     Heart disease Mother     Peripheral vascular disease Mother     Heart attack Father     Heart disease Father     Hypertension Father     Hyperlipidemia Father     Heart attack Maternal Uncle     Heart disease Maternal Uncle     Hypertension Maternal Uncle     Hyperlipidemia Maternal Uncle     Heart attack Paternal Uncle     Heart disease Paternal Uncle     Hypertension Paternal Uncle     Hyperlipidemia Paternal Uncle     Heart disease Maternal Grandmother     Heart attack Maternal Grandmother     Hypertension Maternal Grandmother     Hyperlipidemia Maternal Grandmother     Heart attack Maternal Grandfather     Heart disease Maternal Grandfather     Hypertension Maternal Grandfather     Hyperlipidemia Maternal Grandfather     Heart attack Paternal Grandmother     Heart disease Paternal Grandmother     Hypertension Paternal Grandmother     Hyperlipidemia Paternal Grandmother     Heart attack Paternal Grandfather     Heart disease Paternal Grandfather     Hypertension Paternal Grandfather     Hyperlipidemia Paternal Grandfather        Vitals:   /80 (BP Location: Left arm, Patient Position: Sitting)   Pulse 72   Ht 167.6 cm (65.98\")   Wt 85.5 kg (188 lb 9.6 oz)   BMI 30.46 kg/m²     Physical Exam  Vitals and nursing note reviewed.   Constitutional:       Appearance: He is obese.   Neck:      Vascular: No carotid bruit.   Cardiovascular:      Rate and Rhythm: Normal rate and regular rhythm.      Pulses: Normal pulses.      Heart sounds: Normal heart sounds. No murmur heard.     No friction rub. No gallop.   Pulmonary:      Effort: Pulmonary effort is normal.      Breath sounds: Normal breath sounds and air " entry.   Musculoskeletal:      Right lower leg: No edema.      Left lower leg: No edema.   Skin:     General: Skin is warm and dry.      Capillary Refill: Capillary refill takes less than 2 seconds.   Neurological:      Mental Status: He is alert and oriented to person, place, and time.       Procedures     Assessment & Plan     Diagnoses and all orders for this visit:    1. Essential hypertension (Primary)  -     amLODIPine (NORVASC) 10 MG tablet; Take 1 tablet by mouth Daily. for blood pressure  Dispense: 90 tablet; Refill: 3    2. Hypercholesteremia    3. Coronary artery disease involving native coronary artery of native heart without angina pectoris    4. Hypertriglyceridemia  -     fenofibrate micronized (LOFIBRA) 67 MG capsule; Take 1 capsule by mouth Every Morning Before Breakfast.  Dispense: 90 capsule; Refill: 3    Other orders  -     losartan-hydrochlorothiazide (HYZAAR) 100-25 MG per tablet; Take 1 tablet by mouth Daily.  Dispense: 90 tablet; Refill: 3  -     vitamin D (ERGOCALCIFEROL) 1.25 MG (63888 UT) capsule capsule; Take 1 capsule by mouth Every 14 (Fourteen) Days.  Dispense: 3 capsule; Refill: 4  -     carvedilol (COREG) 25 MG tablet; Take 1 tablet by mouth 2 (Two) Times a Day.  Dispense: 180 tablet; Refill: 3    Hypertension  - BP controlled  - Continue current antihypertensive medication regimen    Hypercholesteremia/hypertriglyceridemia  - No recent labs available, he has an order and will get this done within the next week  - Continue rosuvastatin    CAD  - Asymptomatic and stable  - Continue Brilinta and statin therapy    Stable from a cardiac standpoint. No further testing recommend at this time.  Refill sent to pharmacy and no medication changes made today    Visit Diagnoses:    ICD-10-CM ICD-9-CM   1. Essential hypertension  I10 401.9   2. Hypercholesteremia  E78.00 272.0   3. Coronary artery disease involving native coronary artery of native heart without angina pectoris  I25.10 414.01   4.  Hypertriglyceridemia  E78.1 272.1       Meds Ordered During Visit:  New Medications Ordered This Visit   Medications    amLODIPine (NORVASC) 10 MG tablet     Sig: Take 1 tablet by mouth Daily. for blood pressure     Dispense:  90 tablet     Refill:  3    losartan-hydrochlorothiazide (HYZAAR) 100-25 MG per tablet     Sig: Take 1 tablet by mouth Daily.     Dispense:  90 tablet     Refill:  3    vitamin D (ERGOCALCIFEROL) 1.25 MG (06194 UT) capsule capsule     Sig: Take 1 capsule by mouth Every 14 (Fourteen) Days.     Dispense:  3 capsule     Refill:  4    carvedilol (COREG) 25 MG tablet     Sig: Take 1 tablet by mouth 2 (Two) Times a Day.     Dispense:  180 tablet     Refill:  3    fenofibrate micronized (LOFIBRA) 67 MG capsule     Sig: Take 1 capsule by mouth Every Morning Before Breakfast.     Dispense:  90 capsule     Refill:  3       Follow Up Appointment:   Return in about 1 year (around 8/19/2025), or if symptoms worsen or fail to improve.           This document has been electronically signed by CHON Yanes  August 21, 2024 15:19 EDT    Dictated Utilizing Dragon Dictation: Part of this note may be an electronic transcription/translation of spoken language to printed text using the Dragon Dictation System.

## 2024-12-02 ENCOUNTER — TELEPHONE (OUTPATIENT)
Dept: CARDIOLOGY | Facility: CLINIC | Age: 54
End: 2024-12-02
Payer: MEDICARE

## 2024-12-02 DIAGNOSIS — E55.9 VITAMIN D DEFICIENCY: ICD-10-CM

## 2024-12-02 DIAGNOSIS — R73.9 BLOOD GLUCOSE ELEVATED: ICD-10-CM

## 2024-12-02 DIAGNOSIS — I10 ESSENTIAL HYPERTENSION: Primary | ICD-10-CM

## 2024-12-02 DIAGNOSIS — E78.00 HYPERCHOLESTEREMIA: ICD-10-CM

## 2024-12-02 NOTE — TELEPHONE ENCOUNTER
Patient has requested per My Chart for order for labs and he will have done next week . His last office visit was August 2024 .

## 2024-12-04 ENCOUNTER — LAB (OUTPATIENT)
Dept: LAB | Facility: HOSPITAL | Age: 54
End: 2024-12-04
Payer: MEDICARE

## 2024-12-04 DIAGNOSIS — I10 ESSENTIAL HYPERTENSION: ICD-10-CM

## 2024-12-04 DIAGNOSIS — R73.9 BLOOD GLUCOSE ELEVATED: ICD-10-CM

## 2024-12-04 DIAGNOSIS — E78.00 HYPERCHOLESTEREMIA: ICD-10-CM

## 2024-12-04 DIAGNOSIS — E55.9 VITAMIN D DEFICIENCY: ICD-10-CM

## 2024-12-04 LAB
ALBUMIN SERPL-MCNC: 5.2 G/DL (ref 3.5–5.2)
ALBUMIN/GLOB SERPL: 2.4 G/DL
ALP SERPL-CCNC: 75 U/L (ref 39–117)
ALT SERPL W P-5'-P-CCNC: 25 U/L (ref 1–41)
ANION GAP SERPL CALCULATED.3IONS-SCNC: 13.9 MMOL/L (ref 5–15)
AST SERPL-CCNC: 24 U/L (ref 1–40)
BASOPHILS # BLD AUTO: 0.06 10*3/MM3 (ref 0–0.2)
BASOPHILS NFR BLD AUTO: 1 % (ref 0–1.5)
BILIRUB SERPL-MCNC: 0.5 MG/DL (ref 0–1.2)
BUN SERPL-MCNC: 13 MG/DL (ref 6–20)
BUN/CREAT SERPL: 15.3 (ref 7–25)
CALCIUM SPEC-SCNC: 9.6 MG/DL (ref 8.6–10.5)
CHLORIDE SERPL-SCNC: 101 MMOL/L (ref 98–107)
CHOLEST SERPL-MCNC: 110 MG/DL (ref 0–200)
CO2 SERPL-SCNC: 23.1 MMOL/L (ref 22–29)
CREAT SERPL-MCNC: 0.85 MG/DL (ref 0.76–1.27)
DEPRECATED RDW RBC AUTO: 41.9 FL (ref 37–54)
EGFRCR SERPLBLD CKD-EPI 2021: 103.3 ML/MIN/1.73
EOSINOPHIL # BLD AUTO: 0.27 10*3/MM3 (ref 0–0.4)
EOSINOPHIL NFR BLD AUTO: 4.3 % (ref 0.3–6.2)
ERYTHROCYTE [DISTWIDTH] IN BLOOD BY AUTOMATED COUNT: 13.1 % (ref 12.3–15.4)
GLOBULIN UR ELPH-MCNC: 2.2 GM/DL
GLUCOSE SERPL-MCNC: 104 MG/DL (ref 65–99)
HBA1C MFR BLD: 5.2 % (ref 4.8–5.6)
HCT VFR BLD AUTO: 44 % (ref 37.5–51)
HDLC SERPL-MCNC: 37 MG/DL (ref 40–60)
HGB BLD-MCNC: 14.8 G/DL (ref 13–17.7)
IMM GRANULOCYTES # BLD AUTO: 0.01 10*3/MM3 (ref 0–0.05)
IMM GRANULOCYTES NFR BLD AUTO: 0.2 % (ref 0–0.5)
LDLC SERPL CALC-MCNC: 54 MG/DL (ref 0–100)
LDLC/HDLC SERPL: 1.43 {RATIO}
LYMPHOCYTES # BLD AUTO: 1.31 10*3/MM3 (ref 0.7–3.1)
LYMPHOCYTES NFR BLD AUTO: 20.9 % (ref 19.6–45.3)
MAGNESIUM SERPL-MCNC: 2.2 MG/DL (ref 1.6–2.6)
MCH RBC QN AUTO: 29.5 PG (ref 26.6–33)
MCHC RBC AUTO-ENTMCNC: 33.6 G/DL (ref 31.5–35.7)
MCV RBC AUTO: 87.6 FL (ref 79–97)
MONOCYTES # BLD AUTO: 0.53 10*3/MM3 (ref 0.1–0.9)
MONOCYTES NFR BLD AUTO: 8.5 % (ref 5–12)
NEUTROPHILS NFR BLD AUTO: 4.08 10*3/MM3 (ref 1.7–7)
NEUTROPHILS NFR BLD AUTO: 65.1 % (ref 42.7–76)
NRBC BLD AUTO-RTO: 0 /100 WBC (ref 0–0.2)
PLATELET # BLD AUTO: 316 10*3/MM3 (ref 140–450)
PMV BLD AUTO: 9.2 FL (ref 6–12)
POTASSIUM SERPL-SCNC: 3.7 MMOL/L (ref 3.5–5.2)
PROT SERPL-MCNC: 7.4 G/DL (ref 6–8.5)
RBC # BLD AUTO: 5.02 10*6/MM3 (ref 4.14–5.8)
SODIUM SERPL-SCNC: 138 MMOL/L (ref 136–145)
TRIGL SERPL-MCNC: 101 MG/DL (ref 0–150)
TSH SERPL DL<=0.05 MIU/L-ACNC: 2.09 UIU/ML (ref 0.27–4.2)
VLDLC SERPL-MCNC: 19 MG/DL (ref 5–40)
WBC NRBC COR # BLD AUTO: 6.26 10*3/MM3 (ref 3.4–10.8)

## 2024-12-04 PROCEDURE — 85025 COMPLETE CBC W/AUTO DIFF WBC: CPT

## 2024-12-04 PROCEDURE — 84443 ASSAY THYROID STIM HORMONE: CPT

## 2024-12-04 PROCEDURE — 80061 LIPID PANEL: CPT

## 2024-12-04 PROCEDURE — 36415 COLL VENOUS BLD VENIPUNCTURE: CPT

## 2024-12-04 PROCEDURE — 83735 ASSAY OF MAGNESIUM: CPT

## 2024-12-04 PROCEDURE — 82306 VITAMIN D 25 HYDROXY: CPT

## 2024-12-04 PROCEDURE — 83036 HEMOGLOBIN GLYCOSYLATED A1C: CPT

## 2024-12-04 PROCEDURE — 80053 COMPREHEN METABOLIC PANEL: CPT

## 2024-12-05 LAB — 25(OH)D3 SERPL-MCNC: 27.4 NG/ML (ref 30–100)

## 2025-04-15 RX ORDER — TICAGRELOR 90 MG/1
90 TABLET ORAL EVERY 12 HOURS SCHEDULED
Qty: 180 TABLET | Refills: 3 | Status: SHIPPED | OUTPATIENT
Start: 2025-04-15

## 2025-06-23 ENCOUNTER — TELEPHONE (OUTPATIENT)
Dept: CARDIOLOGY | Facility: CLINIC | Age: 55
End: 2025-06-23

## 2025-06-23 NOTE — TELEPHONE ENCOUNTER
"Caller: Myron Lackey \"KAYLEY\"    Relationship: Self    Best call back number: [phone]    What orders are you requesting (i.e. lab or imaging): LABS    In what timeframe would the patient need to come in: SOMETIME BEFORE THEIR APPT IN SEPTEMBER     Where will you receive your lab/imaging services: WITH US PER PT          "

## 2025-06-24 DIAGNOSIS — E78.00 HYPERCHOLESTEREMIA: ICD-10-CM

## 2025-06-24 DIAGNOSIS — R06.02 SHORTNESS OF BREATH: ICD-10-CM

## 2025-06-24 DIAGNOSIS — E78.1 HYPERTRIGLYCERIDEMIA: ICD-10-CM

## 2025-06-24 DIAGNOSIS — I10 ESSENTIAL HYPERTENSION: Primary | ICD-10-CM

## 2025-07-18 ENCOUNTER — TELEPHONE (OUTPATIENT)
Dept: CARDIOLOGY | Facility: CLINIC | Age: 55
End: 2025-07-18
Payer: MEDICARE

## 2025-07-18 NOTE — TELEPHONE ENCOUNTER
Go to ER with symptoms of blurred vision and if he has any chest pain or shortness of breath.  He has an appointment with Lucila 9/3/2025.  If he continues to have symptoms I can order cardiac workup.  He does need to check with neurology about medication changes he mentioned.

## 2025-07-18 NOTE — TELEPHONE ENCOUNTER
Spoke with patient concerning symptoms. He reports after taking his medications in the mornings he has noticed some blurred vision/double vision. He reports neurology did increase one of his medications by 500 mg, he has called there office. Reports last ER visit sodium was low. Patient drinks coffee and a Mt Dew, does not drink adequate water.    Has noticed swelling in feet since May when he went to [place] and was without his Brilinta for 2-3 weeks. Has also noticed shortness of breath when walking to mail box and also chest heaviness. Advised if having chest pain, need to go to ER. Patient reports he will go to ER after he speaks with Neurology if he continues with shortness of breath and swelling.

## 2025-07-21 ENCOUNTER — LAB (OUTPATIENT)
Dept: LAB | Facility: HOSPITAL | Age: 55
End: 2025-07-21
Payer: MEDICARE

## 2025-07-21 DIAGNOSIS — R06.02 SHORTNESS OF BREATH: ICD-10-CM

## 2025-07-21 DIAGNOSIS — E78.1 HYPERTRIGLYCERIDEMIA: ICD-10-CM

## 2025-07-21 DIAGNOSIS — I10 ESSENTIAL HYPERTENSION: ICD-10-CM

## 2025-07-21 DIAGNOSIS — E78.00 HYPERCHOLESTEREMIA: ICD-10-CM

## 2025-07-21 LAB
ALBUMIN SERPL-MCNC: 5.1 G/DL (ref 3.5–5.2)
ALBUMIN/GLOB SERPL: 2.4 G/DL
ALP SERPL-CCNC: 63 U/L (ref 39–117)
ALT SERPL W P-5'-P-CCNC: 33 U/L (ref 1–41)
ANION GAP SERPL CALCULATED.3IONS-SCNC: 11.4 MMOL/L (ref 5–15)
AST SERPL-CCNC: 24 U/L (ref 1–40)
BASOPHILS # BLD AUTO: 0.05 10*3/MM3 (ref 0–0.2)
BASOPHILS NFR BLD AUTO: 0.8 % (ref 0–1.5)
BILIRUB SERPL-MCNC: 0.5 MG/DL (ref 0–1.2)
BUN SERPL-MCNC: 11.5 MG/DL (ref 6–20)
BUN/CREAT SERPL: 15.3 (ref 7–25)
CALCIUM SPEC-SCNC: 9.4 MG/DL (ref 8.6–10.5)
CHLORIDE SERPL-SCNC: 103 MMOL/L (ref 98–107)
CHOLEST SERPL-MCNC: 139 MG/DL (ref 0–200)
CO2 SERPL-SCNC: 22.6 MMOL/L (ref 22–29)
CREAT SERPL-MCNC: 0.75 MG/DL (ref 0.76–1.27)
DEPRECATED RDW RBC AUTO: 45.7 FL (ref 37–54)
EGFRCR SERPLBLD CKD-EPI 2021: 107.2 ML/MIN/1.73
EOSINOPHIL # BLD AUTO: 0.23 10*3/MM3 (ref 0–0.4)
EOSINOPHIL NFR BLD AUTO: 3.7 % (ref 0.3–6.2)
ERYTHROCYTE [DISTWIDTH] IN BLOOD BY AUTOMATED COUNT: 14.1 % (ref 12.3–15.4)
GLOBULIN UR ELPH-MCNC: 2.1 GM/DL
GLUCOSE SERPL-MCNC: 96 MG/DL (ref 65–99)
HCT VFR BLD AUTO: 44.1 % (ref 37.5–51)
HDLC SERPL-MCNC: 40 MG/DL (ref 40–60)
HGB BLD-MCNC: 14.8 G/DL (ref 13–17.7)
IMM GRANULOCYTES # BLD AUTO: 0.02 10*3/MM3 (ref 0–0.05)
IMM GRANULOCYTES NFR BLD AUTO: 0.3 % (ref 0–0.5)
LDLC SERPL CALC-MCNC: 75 MG/DL (ref 0–100)
LDLC/HDLC SERPL: 1.81 {RATIO}
LYMPHOCYTES # BLD AUTO: 1.28 10*3/MM3 (ref 0.7–3.1)
LYMPHOCYTES NFR BLD AUTO: 20.5 % (ref 19.6–45.3)
MCH RBC QN AUTO: 29.5 PG (ref 26.6–33)
MCHC RBC AUTO-ENTMCNC: 33.6 G/DL (ref 31.5–35.7)
MCV RBC AUTO: 87.8 FL (ref 79–97)
MONOCYTES # BLD AUTO: 0.71 10*3/MM3 (ref 0.1–0.9)
MONOCYTES NFR BLD AUTO: 11.4 % (ref 5–12)
NEUTROPHILS NFR BLD AUTO: 3.95 10*3/MM3 (ref 1.7–7)
NEUTROPHILS NFR BLD AUTO: 63.3 % (ref 42.7–76)
NRBC BLD AUTO-RTO: 0 /100 WBC (ref 0–0.2)
PLATELET # BLD AUTO: 271 10*3/MM3 (ref 140–450)
PMV BLD AUTO: 9.4 FL (ref 6–12)
POTASSIUM SERPL-SCNC: 4.1 MMOL/L (ref 3.5–5.2)
PROT SERPL-MCNC: 7.2 G/DL (ref 6–8.5)
RBC # BLD AUTO: 5.02 10*6/MM3 (ref 4.14–5.8)
SODIUM SERPL-SCNC: 137 MMOL/L (ref 136–145)
TRIGL SERPL-MCNC: 133 MG/DL (ref 0–150)
VLDLC SERPL-MCNC: 24 MG/DL (ref 5–40)
WBC NRBC COR # BLD AUTO: 6.24 10*3/MM3 (ref 3.4–10.8)

## 2025-07-21 PROCEDURE — 80053 COMPREHEN METABOLIC PANEL: CPT

## 2025-07-21 PROCEDURE — 80061 LIPID PANEL: CPT

## 2025-07-21 PROCEDURE — 85025 COMPLETE CBC W/AUTO DIFF WBC: CPT

## 2025-07-21 PROCEDURE — 36415 COLL VENOUS BLD VENIPUNCTURE: CPT

## 2025-07-21 NOTE — TELEPHONE ENCOUNTER
Spoke with patient, he reports that he did not go to ER on Friday. Had labs this morning at [facility]. He feels that he had took to much Trileptal and could have caused his sodium to deplete. Has been drinking Gatorade and increased fluids. Reports doing well today. He is to have labs also at  soon.

## 2025-07-22 ENCOUNTER — TELEPHONE (OUTPATIENT)
Dept: CARDIOLOGY | Facility: CLINIC | Age: 55
End: 2025-07-22
Payer: MEDICARE